# Patient Record
Sex: FEMALE | Race: ASIAN | NOT HISPANIC OR LATINO | Employment: UNEMPLOYED | URBAN - METROPOLITAN AREA
[De-identification: names, ages, dates, MRNs, and addresses within clinical notes are randomized per-mention and may not be internally consistent; named-entity substitution may affect disease eponyms.]

---

## 2024-04-11 ENCOUNTER — LAB (OUTPATIENT)
Dept: LAB | Facility: LAB | Age: 29
End: 2024-04-11
Payer: COMMERCIAL

## 2024-04-11 ENCOUNTER — INITIAL PRENATAL (OUTPATIENT)
Dept: OBSTETRICS AND GYNECOLOGY | Facility: CLINIC | Age: 29
End: 2024-04-11
Payer: COMMERCIAL

## 2024-04-11 VITALS — WEIGHT: 144 LBS | DIASTOLIC BLOOD PRESSURE: 72 MMHG | SYSTOLIC BLOOD PRESSURE: 122 MMHG

## 2024-04-11 DIAGNOSIS — Z3A.28 28 WEEKS GESTATION OF PREGNANCY (HHS-HCC): Primary | ICD-10-CM

## 2024-04-11 DIAGNOSIS — Z3A.28 28 WEEKS GESTATION OF PREGNANCY (HHS-HCC): ICD-10-CM

## 2024-04-11 LAB
ABO GROUP (TYPE) IN BLOOD: NORMAL
ANTIBODY SCREEN: NORMAL
ERYTHROCYTE [DISTWIDTH] IN BLOOD BY AUTOMATED COUNT: 14 % (ref 11.5–14.5)
GLUCOSE 1H P 50 G GLC PO SERPL-MCNC: 151 MG/DL
HBV SURFACE AG SERPL QL IA: NONREACTIVE
HCT VFR BLD AUTO: 33.1 % (ref 36–46)
HCV AB SER QL: NONREACTIVE
HGB BLD-MCNC: 10.2 G/DL (ref 12–16)
HIV 1+2 AB+HIV1 P24 AG SERPL QL IA: NONREACTIVE
MCH RBC QN AUTO: 26.6 PG (ref 26–34)
MCHC RBC AUTO-ENTMCNC: 30.8 G/DL (ref 32–36)
MCV RBC AUTO: 86 FL (ref 80–100)
NRBC BLD-RTO: 0 /100 WBCS (ref 0–0)
PLATELET # BLD AUTO: 185 X10*3/UL (ref 150–450)
RBC # BLD AUTO: 3.83 X10*6/UL (ref 4–5.2)
RH FACTOR (ANTIGEN D): NORMAL
RUBV IGG SERPL IA-ACNC: 3.8 IA
RUBV IGG SERPL QL IA: POSITIVE
TREPONEMA PALLIDUM IGG+IGM AB [PRESENCE] IN SERUM OR PLASMA BY IMMUNOASSAY: NONREACTIVE
WBC # BLD AUTO: 11.1 X10*3/UL (ref 4.4–11.3)

## 2024-04-11 PROCEDURE — 86780 TREPONEMA PALLIDUM: CPT

## 2024-04-11 PROCEDURE — 87340 HEPATITIS B SURFACE AG IA: CPT

## 2024-04-11 PROCEDURE — 36415 COLL VENOUS BLD VENIPUNCTURE: CPT

## 2024-04-11 PROCEDURE — 0500F INITIAL PRENATAL CARE VISIT: CPT | Performed by: OBSTETRICS & GYNECOLOGY

## 2024-04-11 PROCEDURE — 83020 HEMOGLOBIN ELECTROPHORESIS: CPT | Performed by: OBSTETRICS & GYNECOLOGY

## 2024-04-11 PROCEDURE — 86850 RBC ANTIBODY SCREEN: CPT

## 2024-04-11 PROCEDURE — 83021 HEMOGLOBIN CHROMOTOGRAPHY: CPT

## 2024-04-11 PROCEDURE — 87389 HIV-1 AG W/HIV-1&-2 AB AG IA: CPT

## 2024-04-11 PROCEDURE — 85027 COMPLETE CBC AUTOMATED: CPT

## 2024-04-11 PROCEDURE — 86317 IMMUNOASSAY INFECTIOUS AGENT: CPT

## 2024-04-11 PROCEDURE — 82947 ASSAY GLUCOSE BLOOD QUANT: CPT

## 2024-04-11 PROCEDURE — 86901 BLOOD TYPING SEROLOGIC RH(D): CPT

## 2024-04-11 PROCEDURE — 86803 HEPATITIS C AB TEST: CPT

## 2024-04-11 PROCEDURE — 86900 BLOOD TYPING SEROLOGIC ABO: CPT

## 2024-04-11 ASSESSMENT — ENCOUNTER SYMPTOMS
DYSURIA: 0
FLANK PAIN: 0
SHORTNESS OF BREATH: 0
ABDOMINAL DISTENTION: 0
FATIGUE: 0
APPETITE CHANGE: 0
CONSTIPATION: 0
BACK PAIN: 0
CHILLS: 0
FREQUENCY: 0
HEMATURIA: 0
FEVER: 0
ABDOMINAL PAIN: 0
VOMITING: 0
NAUSEA: 0
DIARRHEA: 0
UNEXPECTED WEIGHT CHANGE: 0
BLOOD IN STOOL: 0
SLEEP DISTURBANCE: 0
COLOR CHANGE: 0

## 2024-04-11 NOTE — PROGRESS NOTES
Subjective   Patient ID [id number]   Mey Otoole is a 29 y.o.  at 28w4d with a working estimated date of delivery of 2024, by Ultrasound who presents for an initial prenatal visit/transfer of care.  Recently moved from [facility].    Pt is here with a friend, Rowena, who helps with scheduling/getting around and her , Matthew.    [employer]  used throughout visit. Pt gave consent for friend and  to be present through full history and physical exam portions of visit.     Her pregnancy is so far uncomplicated.   Pt appears to have full PN care prior to moving to . She has a folder with all her test results and ultrasounds which are mostly in Brazilian.     Pt denies major medical problems. She is only taking a PNV.  She denies h/o surgery.     OB History    Para Term  AB Living   1         0   SAB IAB Ectopic Multiple Live Births                  # Outcome Date GA Lbr Gunnar/2nd Weight Sex Delivery Anes PTL Lv   1 Current                 Review of Systems   Constitutional:  Negative for appetite change, chills, fatigue, fever and unexpected weight change.   Respiratory:  Negative for shortness of breath.    Cardiovascular:  Negative for chest pain.   Gastrointestinal:  Negative for abdominal distention, abdominal pain, blood in stool, constipation, diarrhea, nausea and vomiting.   Endocrine: Negative for cold intolerance and heat intolerance.   Genitourinary:  Negative for dyspareunia, dysuria, flank pain, frequency, genital sores, hematuria, menstrual problem, pelvic pain, urgency, vaginal bleeding, vaginal discharge and vaginal pain.   Musculoskeletal:  Negative for back pain.   Skin:  Negative for color change.   Psychiatric/Behavioral:  Negative for sleep disturbance.        Objective     Physical Exam  Constitutional:       Appearance: Normal appearance.   Abdominal:      Palpations: Abdomen is soft.      Tenderness: There is no abdominal tenderness.      Comments: +gravid    Skin:     General: Skin is warm and dry.   Neurological:      General: No focal deficit present.      Mental Status: She is alert.   Psychiatric:         Mood and Affect: Mood normal.         Behavior: Behavior normal.                Expected Total Weight Gain: Could not be calculated   Pregravid BMI: Could not be calculated             Physical Exam  Constitutional:       Appearance: Normal appearance.   Abdominal:      Palpations: Abdomen is soft.      Tenderness: There is no abdominal tenderness.      Comments: +gravid   Neurological:      General: No focal deficit present.      Mental Status: She is alert.   Skin:     General: Skin is warm and dry.   Psychiatric:         Mood and Affect: Mood normal.         Behavior: Behavior normal.           Assessment/Plan         Prenatal Labs ordered - pt accepting of repeat labs so we may interpret and things like type and screen will be available in our system.   Pt has not yet done 1 hr glucose test - this will be ordered and completed today.   Growth US ordered.     Cont PNV daily.   First trimester screening was done an is normal.  The fetus is female sex.   Follow up in 2 weeks.

## 2024-04-13 LAB
HEMOGLOBIN A2: 2.7 % (ref 2–3.5)
HEMOGLOBIN A: 96.9 % (ref 95.8–98)
HEMOGLOBIN F: 0.4 % (ref 0–2)
HEMOGLOBIN IDENTIFICATION INTERPRETATION: NORMAL
PATH REVIEW-HGB IDENTIFICATION: NORMAL

## 2024-04-15 DIAGNOSIS — R73.09 GLUCOSE TOLERANCE TEST ABNORMAL: Primary | ICD-10-CM

## 2024-04-15 DIAGNOSIS — Z3A.28 28 WEEKS GESTATION OF PREGNANCY (HHS-HCC): ICD-10-CM

## 2024-04-16 ENCOUNTER — TELEPHONE (OUTPATIENT)
Dept: OBSTETRICS AND GYNECOLOGY | Facility: CLINIC | Age: 29
End: 2024-04-16
Payer: COMMERCIAL

## 2024-04-16 NOTE — TELEPHONE ENCOUNTER
Patient's emergency contact returning call  Identified by name and   Patient inquiring about [facility] information, stated that she spoke with [facility] office who told her that she needs to reach back out to our office to sign up. Informed her she could bring this up at patient appointment on Monday and would likely be able to sign up in person then and discuss with Dr. Cardoza.    Informed patient's proxy of her blood work results, showing anemia and elevated glucose, and need for 3 hour GTT. Informed her that level was at 151, and we like to see a level of <135.  Patient educated that she will need to fast meaning nothing to eat or drink for at least 8 hours prior to the test except water.   The test will take approximately 3 hours requiring 4 blood draws and she must stay at the lab during that time.   Patient encouraged to complete testing as soon as possible.   Patient's proxy verbalized understanding and denies any questions or concerns, will be bringing patient Saturday to have this done, as well as to repeat other blood work labs for her anemia.  All questions and concerns addressed at this time.    LELA LópezN RN

## 2024-04-16 NOTE — TELEPHONE ENCOUNTER
Called patient to discuss results and follow up  Left vm for patient to return call.    ALEXANDR López RN        ----- Message from Katie TEAGUE MD sent at 4/15/2024  4:31 PM EDT -----  This patient is Bulgarian speaking but ok to speak with her , Matthew.  Pt had elevated 1 hr glucose test and needs 3 hr glucose test. Also had some mild anemia. Needs additional labs for anemia (ferritin, B12 and folate) which I will order and patient can have drawn at time of 3 hr glucose test.  Thanks.

## 2024-04-16 NOTE — TELEPHONE ENCOUNTER
Patient went to the [facility] office and tried to apply but was told she needed to sign up through her OBGYN's office.  Patients friend/advocate would like a call.

## 2024-04-18 ENCOUNTER — HOSPITAL ENCOUNTER (OUTPATIENT)
Dept: RADIOLOGY | Facility: HOSPITAL | Age: 29
Discharge: HOME | End: 2024-04-18
Payer: COMMERCIAL

## 2024-04-18 DIAGNOSIS — Z3A.28 28 WEEKS GESTATION OF PREGNANCY (HHS-HCC): ICD-10-CM

## 2024-04-18 PROCEDURE — 76811 OB US DETAILED SNGL FETUS: CPT | Performed by: MEDICAL GENETICS

## 2024-04-18 PROCEDURE — 76819 FETAL BIOPHYS PROFIL W/O NST: CPT | Performed by: MEDICAL GENETICS

## 2024-04-18 PROCEDURE — 76811 OB US DETAILED SNGL FETUS: CPT

## 2024-04-20 ENCOUNTER — LAB (OUTPATIENT)
Dept: LAB | Facility: LAB | Age: 29
End: 2024-04-20
Payer: COMMERCIAL

## 2024-04-22 ENCOUNTER — ROUTINE PRENATAL (OUTPATIENT)
Dept: OBSTETRICS AND GYNECOLOGY | Facility: CLINIC | Age: 29
End: 2024-04-22
Payer: COMMERCIAL

## 2024-04-22 VITALS — WEIGHT: 146 LBS | SYSTOLIC BLOOD PRESSURE: 109 MMHG | DIASTOLIC BLOOD PRESSURE: 65 MMHG

## 2024-04-22 DIAGNOSIS — Z3A.30 30 WEEKS GESTATION OF PREGNANCY (HHS-HCC): Primary | ICD-10-CM

## 2024-04-22 PROCEDURE — 0501F PRENATAL FLOW SHEET: CPT | Performed by: OBSTETRICS & GYNECOLOGY

## 2024-04-22 PROCEDURE — 90471 IMMUNIZATION ADMIN: CPT | Performed by: OBSTETRICS & GYNECOLOGY

## 2024-04-22 PROCEDURE — 90715 TDAP VACCINE 7 YRS/> IM: CPT | Performed by: OBSTETRICS & GYNECOLOGY

## 2024-04-22 NOTE — PROGRESS NOTES
Pt currently 30w1d  Pt has no complaints.    Normal fetal movement. Denies ctx, LOF, VB.     [employer]  used throughout visit.     OB labs reviewed and wnl.  TDAP offered and accepted today    Abnormal 1 hr glucose test  Plans 3 hr glucose tomorrow    Anemia   Follow up blood work tomorrow with 3 hr glucose test    Ultrasound  EFW 1449 gm @ 29.4 weeks  Follow up 4 weeks for growth ultrasound    Cont PNV  Follow up 2 weeks with PNV

## 2024-04-23 ENCOUNTER — LAB (OUTPATIENT)
Dept: LAB | Facility: LAB | Age: 29
End: 2024-04-23
Payer: COMMERCIAL

## 2024-04-23 DIAGNOSIS — R73.09 GLUCOSE TOLERANCE TEST ABNORMAL: ICD-10-CM

## 2024-04-23 DIAGNOSIS — Z3A.28 28 WEEKS GESTATION OF PREGNANCY (HHS-HCC): ICD-10-CM

## 2024-04-23 LAB
FERRITIN SERPL-MCNC: 42 NG/ML (ref 8–150)
FOLATE SERPL-MCNC: >24 NG/ML
GLUCOSE 1H P 100 G GLC PO SERPL-MCNC: 174 MG/DL
GLUCOSE 2H P 100 G GLC PO SERPL-MCNC: 137 MG/DL
GLUCOSE 3H P 100 G GLC PO SERPL-MCNC: 77 MG/DL
GLUCOSE P FAST SERPL-MCNC: 88 MG/DL
IRON SATN MFR SERPL: 54 % (ref 25–45)
IRON SERPL-MCNC: 246 UG/DL (ref 35–150)
TIBC SERPL-MCNC: 456 UG/DL (ref 240–445)
UIBC SERPL-MCNC: 210 UG/DL (ref 110–370)
VIT B12 SERPL-MCNC: 363 PG/ML (ref 211–911)

## 2024-04-23 PROCEDURE — 82952 GTT-ADDED SAMPLES: CPT

## 2024-04-23 PROCEDURE — 82950 GLUCOSE TEST: CPT

## 2024-04-23 PROCEDURE — 36415 COLL VENOUS BLD VENIPUNCTURE: CPT

## 2024-04-23 PROCEDURE — 82607 VITAMIN B-12: CPT

## 2024-04-23 PROCEDURE — 82728 ASSAY OF FERRITIN: CPT

## 2024-04-23 PROCEDURE — 82947 ASSAY GLUCOSE BLOOD QUANT: CPT

## 2024-04-23 PROCEDURE — 83540 ASSAY OF IRON: CPT

## 2024-04-23 PROCEDURE — 82746 ASSAY OF FOLIC ACID SERUM: CPT

## 2024-04-23 PROCEDURE — 83550 IRON BINDING TEST: CPT

## 2024-05-09 ENCOUNTER — HOSPITAL ENCOUNTER (OUTPATIENT)
Dept: RADIOLOGY | Facility: HOSPITAL | Age: 29
Discharge: HOME | End: 2024-05-09
Payer: COMMERCIAL

## 2024-05-09 ENCOUNTER — APPOINTMENT (OUTPATIENT)
Dept: OBSTETRICS AND GYNECOLOGY | Facility: CLINIC | Age: 29
End: 2024-05-09
Payer: COMMERCIAL

## 2024-05-09 DIAGNOSIS — Z3A.28 28 WEEKS GESTATION OF PREGNANCY (HHS-HCC): ICD-10-CM

## 2024-05-09 PROCEDURE — 76816 OB US FOLLOW-UP PER FETUS: CPT | Performed by: MEDICAL GENETICS

## 2024-05-09 PROCEDURE — 76819 FETAL BIOPHYS PROFIL W/O NST: CPT | Performed by: MEDICAL GENETICS

## 2024-05-09 PROCEDURE — 76816 OB US FOLLOW-UP PER FETUS: CPT

## 2024-05-13 ENCOUNTER — ROUTINE PRENATAL (OUTPATIENT)
Dept: OBSTETRICS AND GYNECOLOGY | Facility: CLINIC | Age: 29
End: 2024-05-13
Payer: COMMERCIAL

## 2024-05-13 VITALS — WEIGHT: 150 LBS | SYSTOLIC BLOOD PRESSURE: 104 MMHG | DIASTOLIC BLOOD PRESSURE: 66 MMHG

## 2024-05-13 DIAGNOSIS — Z3A.33 33 WEEKS GESTATION OF PREGNANCY (HHS-HCC): Primary | ICD-10-CM

## 2024-05-13 DIAGNOSIS — D64.9 ANEMIA, UNSPECIFIED TYPE: Primary | ICD-10-CM

## 2024-05-13 PROCEDURE — 0501F PRENATAL FLOW SHEET: CPT | Performed by: OBSTETRICS & GYNECOLOGY

## 2024-05-13 RX ORDER — FERROUS SULFATE 325(65) MG
325 TABLET ORAL
Qty: 90 TABLET | Refills: 3 | Status: SHIPPED | OUTPATIENT
Start: 2024-05-13 | End: 2025-05-13

## 2024-05-13 NOTE — PROGRESS NOTES
Pt currently 33w1d  Pt has no complaints.    Normal fetal movement. Denies ctx, LOF, VB.    [employer]  used throughout visit.     Initial prenatal care in [place]. Initial prenatal labs repeated on transfer of care and wnl.     Anemia  -start iron daily     S/p TDAP    Abnormal 1 hr glucose, normal 3 hour glucose.     Growth US 5/9/24 wnl @ 32.4 weeks   -EFW 2095 gm (53rd percentile)  -AGGIE 15.9 cm   -placenta posterior  -presentation: transverse lie

## 2024-05-20 ENCOUNTER — ROUTINE PRENATAL (OUTPATIENT)
Dept: OBSTETRICS AND GYNECOLOGY | Facility: CLINIC | Age: 29
End: 2024-05-20
Payer: COMMERCIAL

## 2024-05-20 VITALS — SYSTOLIC BLOOD PRESSURE: 105 MMHG | DIASTOLIC BLOOD PRESSURE: 68 MMHG | WEIGHT: 151 LBS

## 2024-05-20 DIAGNOSIS — Z3A.34 34 WEEKS GESTATION OF PREGNANCY (HHS-HCC): Primary | ICD-10-CM

## 2024-05-20 PROCEDURE — 0501F PRENATAL FLOW SHEET: CPT | Performed by: OBSTETRICS & GYNECOLOGY

## 2024-05-20 NOTE — PROGRESS NOTES
Pt currently 34w1d  Pt has no complaints.    Normal fetal movement. Denies ctx, LOF, VB.     [employer]  used throughout visit.      Initial prenatal care in [place]. Initial prenatal labs repeated on transfer of care and wnl.   Reviewed hospital for delivery - pt and  prefer  main [place].   Labor precautions reviewed, advised pt/ they can call and speak with doctor on-call after hours if any questions or concerns.      Anemia  -continue iron daily     S/p TDAP     Abnormal 1 hr glucose, normal 3 hour glucose.      Growth US 5/9/24 wnl @ 32.4 weeks   -EFW 2095 gm (53rd percentile)  -AGGIE 15.9 cm   -placenta posterior  -presentation: transverse lie

## 2024-05-29 ENCOUNTER — ROUTINE PRENATAL (OUTPATIENT)
Dept: OBSTETRICS AND GYNECOLOGY | Facility: CLINIC | Age: 29
End: 2024-05-29
Payer: COMMERCIAL

## 2024-05-29 VITALS — DIASTOLIC BLOOD PRESSURE: 59 MMHG | WEIGHT: 151 LBS | SYSTOLIC BLOOD PRESSURE: 110 MMHG

## 2024-05-29 DIAGNOSIS — Z3A.35 35 WEEKS GESTATION OF PREGNANCY (HHS-HCC): Primary | ICD-10-CM

## 2024-05-29 PROCEDURE — 0501F PRENATAL FLOW SHEET: CPT | Performed by: OBSTETRICS & GYNECOLOGY

## 2024-05-29 NOTE — PROGRESS NOTES
Pt currently 35w3d  Pt has no complaints.    Normal fetal movement. Denies ctx, LOF, VB.     [employer]  used throughout visit.      Initial prenatal care in [place]. Initial prenatal labs repeated on transfer of care and wnl.   Reviewed hospital for delivery - pt and  prefer  main [place].   Labor precautions reviewed, advised pt/ they can call and speak with doctor on-call after hours if any questions or concerns.      Anemia  -continue iron daily     S/p TDAP     Abnormal 1 hr glucose, normal 3 hour glucose.      Growth US 5/9/24 wnl @ 32.4 weeks   -EFW 2095 gm (53rd percentile)  -AGGIE 15.9 cm   -placenta posterior  -presentation: transverse lie    GBS cx next visit   Consider US next week for presentation

## 2024-06-03 ENCOUNTER — ROUTINE PRENATAL (OUTPATIENT)
Dept: OBSTETRICS AND GYNECOLOGY | Facility: CLINIC | Age: 29
End: 2024-06-03
Payer: COMMERCIAL

## 2024-06-03 VITALS — WEIGHT: 155 LBS | SYSTOLIC BLOOD PRESSURE: 110 MMHG | DIASTOLIC BLOOD PRESSURE: 60 MMHG

## 2024-06-03 DIAGNOSIS — Z3A.36 36 WEEKS GESTATION OF PREGNANCY (HHS-HCC): Primary | ICD-10-CM

## 2024-06-03 PROCEDURE — 87081 CULTURE SCREEN ONLY: CPT

## 2024-06-03 PROCEDURE — 0501F PRENATAL FLOW SHEET: CPT | Performed by: OBSTETRICS & GYNECOLOGY

## 2024-06-03 NOTE — PROGRESS NOTES
Pt currently 36w1d  Pt has no complaints.    Normal fetal movement. Denies ctx, LOF, VB.     [employer]  used throughout visit.      Initial prenatal care in [place]. Initial prenatal labs repeated on transfer of care and wnl.   Reviewed hospital for delivery - pt and  prefer [facility].   Labor precautions reviewed, advised pt/ they can call and speak with doctor on-call after hours if any questions or concerns.      Anemia  -continue iron daily     S/p TDAP     Abnormal 1 hr glucose, normal 3 hour glucose.     GBS cx done today     Baby cephalic on leopold's today

## 2024-06-06 LAB — GP B STREP GENITAL QL CULT: ABNORMAL

## 2024-06-10 ENCOUNTER — ROUTINE PRENATAL (OUTPATIENT)
Dept: OBSTETRICS AND GYNECOLOGY | Facility: CLINIC | Age: 29
End: 2024-06-10
Payer: COMMERCIAL

## 2024-06-10 VITALS — DIASTOLIC BLOOD PRESSURE: 65 MMHG | WEIGHT: 156 LBS | SYSTOLIC BLOOD PRESSURE: 103 MMHG

## 2024-06-10 DIAGNOSIS — Z3A.37 37 WEEKS GESTATION OF PREGNANCY (HHS-HCC): ICD-10-CM

## 2024-06-10 DIAGNOSIS — Z33.1 NORMAL PREGNANCY, INCIDENTAL (HHS-HCC): Primary | ICD-10-CM

## 2024-06-10 PROCEDURE — 0501F PRENATAL FLOW SHEET: CPT | Performed by: OBSTETRICS & GYNECOLOGY

## 2024-06-10 NOTE — PROGRESS NOTES
Pt currently 37w1d  Pt has no complaints.    Normal fetal movement. Denies ctx, LOF, VB.     [employer]  used throughout visit.     Reviewed GBS positive status and need for abx in labor.   Labor precautions reviewed.

## 2024-06-17 ENCOUNTER — APPOINTMENT (OUTPATIENT)
Dept: OBSTETRICS AND GYNECOLOGY | Facility: CLINIC | Age: 29
End: 2024-06-17
Payer: COMMERCIAL

## 2024-06-17 VITALS — WEIGHT: 161 LBS | SYSTOLIC BLOOD PRESSURE: 118 MMHG | DIASTOLIC BLOOD PRESSURE: 77 MMHG

## 2024-06-17 DIAGNOSIS — Z3A.38 38 WEEKS GESTATION OF PREGNANCY (HHS-HCC): Primary | ICD-10-CM

## 2024-06-17 PROCEDURE — 0501F PRENATAL FLOW SHEET: CPT | Performed by: OBSTETRICS & GYNECOLOGY

## 2024-06-17 NOTE — PROGRESS NOTES
Pt currently 38w1d  Pt has no complaints.    Normal fetal movement. Denies ctx, LOF, VB.     [employer]  used throughout visit.     Presentation confirmed vertex by bedside ultrasound today.      Reviewed GBS positive status and need for abx in labor.   Labor precautions reviewed.

## 2024-06-24 ENCOUNTER — LAB (OUTPATIENT)
Dept: LAB | Facility: LAB | Age: 29
End: 2024-06-24
Payer: COMMERCIAL

## 2024-06-24 ENCOUNTER — TELEPHONE (OUTPATIENT)
Dept: OBSTETRICS AND GYNECOLOGY | Facility: CLINIC | Age: 29
End: 2024-06-24

## 2024-06-24 ENCOUNTER — APPOINTMENT (OUTPATIENT)
Dept: OBSTETRICS AND GYNECOLOGY | Facility: CLINIC | Age: 29
End: 2024-06-24
Payer: COMMERCIAL

## 2024-06-24 VITALS — WEIGHT: 163 LBS | DIASTOLIC BLOOD PRESSURE: 76 MMHG | SYSTOLIC BLOOD PRESSURE: 109 MMHG

## 2024-06-24 DIAGNOSIS — Z3A.39 39 WEEKS GESTATION OF PREGNANCY (HHS-HCC): Primary | ICD-10-CM

## 2024-06-24 DIAGNOSIS — O99.713 PRURITUS OF PREGNANCY IN THIRD TRIMESTER (HHS-HCC): ICD-10-CM

## 2024-06-24 DIAGNOSIS — L29.9 PRURITUS OF PREGNANCY IN THIRD TRIMESTER (HHS-HCC): ICD-10-CM

## 2024-06-24 DIAGNOSIS — Z3A.39 39 WEEKS GESTATION OF PREGNANCY (HHS-HCC): ICD-10-CM

## 2024-06-24 LAB
ALBUMIN SERPL BCP-MCNC: 3.4 G/DL (ref 3.4–5)
ALP SERPL-CCNC: 109 U/L (ref 33–110)
ALT SERPL W P-5'-P-CCNC: 15 U/L (ref 7–45)
ANION GAP SERPL CALC-SCNC: 15 MMOL/L (ref 10–20)
AST SERPL W P-5'-P-CCNC: 18 U/L (ref 9–39)
BILIRUB SERPL-MCNC: 0.4 MG/DL (ref 0–1.2)
BUN SERPL-MCNC: 8 MG/DL (ref 6–23)
CALCIUM SERPL-MCNC: 8.8 MG/DL (ref 8.6–10.6)
CHLORIDE SERPL-SCNC: 104 MMOL/L (ref 98–107)
CO2 SERPL-SCNC: 22 MMOL/L (ref 21–32)
CREAT SERPL-MCNC: 0.63 MG/DL (ref 0.5–1.05)
EGFRCR SERPLBLD CKD-EPI 2021: >90 ML/MIN/1.73M*2
ERYTHROCYTE [DISTWIDTH] IN BLOOD BY AUTOMATED COUNT: 13.9 % (ref 11.5–14.5)
GLUCOSE SERPL-MCNC: 126 MG/DL (ref 74–99)
HCT VFR BLD AUTO: 37.1 % (ref 36–46)
HGB BLD-MCNC: 11.7 G/DL (ref 12–16)
MCH RBC QN AUTO: 27.9 PG (ref 26–34)
MCHC RBC AUTO-ENTMCNC: 31.5 G/DL (ref 32–36)
MCV RBC AUTO: 89 FL (ref 80–100)
NRBC BLD-RTO: 0 /100 WBCS (ref 0–0)
PLATELET # BLD AUTO: 187 X10*3/UL (ref 150–450)
POTASSIUM SERPL-SCNC: 4 MMOL/L (ref 3.5–5.3)
PROT SERPL-MCNC: 5.9 G/DL (ref 6.4–8.2)
RBC # BLD AUTO: 4.19 X10*6/UL (ref 4–5.2)
SODIUM SERPL-SCNC: 137 MMOL/L (ref 136–145)
WBC # BLD AUTO: 9.1 X10*3/UL (ref 4.4–11.3)

## 2024-06-24 PROCEDURE — 82239 BILE ACIDS TOTAL: CPT

## 2024-06-24 PROCEDURE — 83789 MASS SPECTROMETRY QUAL/QUAN: CPT

## 2024-06-24 PROCEDURE — 0501F PRENATAL FLOW SHEET: CPT | Performed by: OBSTETRICS & GYNECOLOGY

## 2024-06-24 PROCEDURE — 36415 COLL VENOUS BLD VENIPUNCTURE: CPT

## 2024-06-24 PROCEDURE — 85027 COMPLETE CBC AUTOMATED: CPT

## 2024-06-24 PROCEDURE — 80053 COMPREHEN METABOLIC PANEL: CPT

## 2024-06-24 NOTE — TELEPHONE ENCOUNTER
LM for patient's spouse Matthew to return phone call. Would like to offer IOL for possible cholestasis.  Will take several days for bile acids to return and due to pregnancy >39 weeks, IOL is reasonable now.

## 2024-06-24 NOTE — PROGRESS NOTES
Pt currently 39w1d  Pt complains of some itching all over body at night over the last 4 nights. Comes and goes.  Not worse on any one part of the body.    Normal fetal movement. Denies ctx, LOF, VB.     [employer]  used throughout visit. (Venezuelan)    Pruritus of pregnancy  -no rash or lesions ; happens only at night, not during the day at all  -bile acids ordered  -short follow up on Thursday  -advised pt to monitor fetal movement closely and call/message me if itching is worsening    Reviewed GBS positive status and need for abx in labor.   Labor precautions reviewed.

## 2024-06-26 LAB
BILE AC SERPL-SCNC: 4 UMOL/L (ref 0–7)
BILE AC SERPL-SCNC: 5 UMOL/L (ref 0–10)
CDCAE SERPL-SCNC: 1.8 UMOL/L (ref 0–3.4)
CHOLATE SERPL-SCNC: 0.9 UMOL/L (ref 0–1.9)
DO-CHOLATE SERPL-SCNC: 1 UMOL/L (ref 0–2.5)
URSODEOXYCHOLATE SERPL-SCNC: 0.3 UMOL/L (ref 0–1)

## 2024-06-27 ENCOUNTER — ANESTHESIA (OUTPATIENT)
Dept: OBSTETRICS AND GYNECOLOGY | Facility: HOSPITAL | Age: 29
End: 2024-06-27
Payer: COMMERCIAL

## 2024-06-27 ENCOUNTER — APPOINTMENT (OUTPATIENT)
Dept: OBSTETRICS AND GYNECOLOGY | Facility: CLINIC | Age: 29
End: 2024-06-27
Payer: COMMERCIAL

## 2024-06-27 ENCOUNTER — ANESTHESIA EVENT (OUTPATIENT)
Dept: OBSTETRICS AND GYNECOLOGY | Facility: HOSPITAL | Age: 29
End: 2024-06-27
Payer: COMMERCIAL

## 2024-06-27 ENCOUNTER — HOSPITAL ENCOUNTER (INPATIENT)
Facility: HOSPITAL | Age: 29
LOS: 3 days | Discharge: HOME | End: 2024-06-30
Attending: STUDENT IN AN ORGANIZED HEALTH CARE EDUCATION/TRAINING PROGRAM
Payer: COMMERCIAL

## 2024-06-27 DIAGNOSIS — Z87.59 STATUS POST VACUUM-ASSISTED VAGINAL DELIVERY: Primary | ICD-10-CM

## 2024-06-27 DIAGNOSIS — D64.9 ANEMIA, UNSPECIFIED TYPE: ICD-10-CM

## 2024-06-27 LAB
ABO GROUP (TYPE) IN BLOOD: NORMAL
ANTIBODY SCREEN: NORMAL
ERYTHROCYTE [DISTWIDTH] IN BLOOD BY AUTOMATED COUNT: 13.5 % (ref 11.5–14.5)
HCT VFR BLD AUTO: 38.8 % (ref 36–46)
HGB BLD-MCNC: 12.3 G/DL (ref 12–16)
MCH RBC QN AUTO: 27.5 PG (ref 26–34)
MCHC RBC AUTO-ENTMCNC: 31.7 G/DL (ref 32–36)
MCV RBC AUTO: 87 FL (ref 80–100)
NRBC BLD-RTO: 0 /100 WBCS (ref 0–0)
PLATELET # BLD AUTO: 201 X10*3/UL (ref 150–450)
POC APPEARANCE, URINE: CLEAR
POC BILIRUBIN, URINE: NEGATIVE
POC BLOOD, URINE: ABNORMAL
POC COLOR, URINE: YELLOW
POC GLUCOSE, URINE: NEGATIVE MG/DL
POC KETONES, URINE: NEGATIVE MG/DL
POC LEUKOCYTES, URINE: ABNORMAL
POC NITRITE,URINE: NEGATIVE
POC PH, URINE: 7 PH
POC PROTEIN, URINE: NEGATIVE MG/DL
POC SPECIFIC GRAVITY, URINE: 1.01
POC UROBILINOGEN, URINE: 0.2 EU/DL
RBC # BLD AUTO: 4.47 X10*6/UL (ref 4–5.2)
RH FACTOR (ANTIGEN D): NORMAL
TREPONEMA PALLIDUM IGG+IGM AB [PRESENCE] IN SERUM OR PLASMA BY IMMUNOASSAY: NONREACTIVE
WBC # BLD AUTO: 10.5 X10*3/UL (ref 4.4–11.3)

## 2024-06-27 PROCEDURE — 2500000004 HC RX 250 GENERAL PHARMACY W/ HCPCS (ALT 636 FOR OP/ED)

## 2024-06-27 PROCEDURE — 86780 TREPONEMA PALLIDUM: CPT

## 2024-06-27 PROCEDURE — 59025 FETAL NON-STRESS TEST: CPT

## 2024-06-27 PROCEDURE — 59050 FETAL MONITOR W/REPORT: CPT

## 2024-06-27 PROCEDURE — 99214 OFFICE O/P EST MOD 30 MIN: CPT | Mod: 25

## 2024-06-27 PROCEDURE — 85027 COMPLETE CBC AUTOMATED: CPT

## 2024-06-27 PROCEDURE — 36415 COLL VENOUS BLD VENIPUNCTURE: CPT

## 2024-06-27 PROCEDURE — 7210000002 HC LABOR PER HOUR

## 2024-06-27 PROCEDURE — 86901 BLOOD TYPING SEROLOGIC RH(D): CPT

## 2024-06-27 PROCEDURE — 51701 INSERT BLADDER CATHETER: CPT

## 2024-06-27 PROCEDURE — 3E033VJ INTRODUCTION OF OTHER HORMONE INTO PERIPHERAL VEIN, PERCUTANEOUS APPROACH: ICD-10-PCS

## 2024-06-27 PROCEDURE — 2500000005 HC RX 250 GENERAL PHARMACY W/O HCPCS

## 2024-06-27 PROCEDURE — 81002 URINALYSIS NONAUTO W/O SCOPE: CPT

## 2024-06-27 PROCEDURE — 3700000014 EPIDURAL BLOCK: Mod: GC

## 2024-06-27 PROCEDURE — 1120000001 HC OB PRIVATE ROOM DAILY

## 2024-06-27 PROCEDURE — 86850 RBC ANTIBODY SCREEN: CPT

## 2024-06-27 RX ORDER — ACETAMINOPHEN 325 MG/1
975 TABLET ORAL EVERY 6 HOURS PRN
Status: DISCONTINUED | OUTPATIENT
Start: 2024-06-27 | End: 2024-06-28

## 2024-06-27 RX ORDER — CARBOPROST TROMETHAMINE 250 UG/ML
250 INJECTION, SOLUTION INTRAMUSCULAR ONCE AS NEEDED
Status: DISCONTINUED | OUTPATIENT
Start: 2024-06-27 | End: 2024-06-28

## 2024-06-27 RX ORDER — ONDANSETRON HYDROCHLORIDE 2 MG/ML
4 INJECTION, SOLUTION INTRAVENOUS EVERY 6 HOURS PRN
Status: DISCONTINUED | OUTPATIENT
Start: 2024-06-27 | End: 2024-06-28

## 2024-06-27 RX ORDER — TERBUTALINE SULFATE 1 MG/ML
0.25 INJECTION SUBCUTANEOUS ONCE AS NEEDED
Status: DISCONTINUED | OUTPATIENT
Start: 2024-06-27 | End: 2024-06-28

## 2024-06-27 RX ORDER — LOPERAMIDE HYDROCHLORIDE 2 MG/1
4 CAPSULE ORAL EVERY 2 HOUR PRN
Status: DISCONTINUED | OUTPATIENT
Start: 2024-06-27 | End: 2024-06-28

## 2024-06-27 RX ORDER — METOCLOPRAMIDE HYDROCHLORIDE 5 MG/ML
10 INJECTION INTRAMUSCULAR; INTRAVENOUS EVERY 6 HOURS PRN
Status: DISCONTINUED | OUTPATIENT
Start: 2024-06-27 | End: 2024-06-28

## 2024-06-27 RX ORDER — HYDRALAZINE HYDROCHLORIDE 20 MG/ML
5 INJECTION INTRAMUSCULAR; INTRAVENOUS ONCE AS NEEDED
Status: DISCONTINUED | OUTPATIENT
Start: 2024-06-27 | End: 2024-06-28

## 2024-06-27 RX ORDER — NIFEDIPINE 10 MG/1
10 CAPSULE ORAL ONCE AS NEEDED
Status: DISCONTINUED | OUTPATIENT
Start: 2024-06-27 | End: 2024-06-28

## 2024-06-27 RX ORDER — SODIUM CHLORIDE, SODIUM LACTATE, POTASSIUM CHLORIDE, CALCIUM CHLORIDE 600; 310; 30; 20 MG/100ML; MG/100ML; MG/100ML; MG/100ML
125 INJECTION, SOLUTION INTRAVENOUS CONTINUOUS
Status: DISCONTINUED | OUTPATIENT
Start: 2024-06-27 | End: 2024-06-28

## 2024-06-27 RX ORDER — OXYTOCIN/0.9 % SODIUM CHLORIDE 30/500 ML
60 PLASTIC BAG, INJECTION (ML) INTRAVENOUS ONCE AS NEEDED
Status: DISCONTINUED | OUTPATIENT
Start: 2024-06-27 | End: 2024-06-28

## 2024-06-27 RX ORDER — METOCLOPRAMIDE 10 MG/1
10 TABLET ORAL EVERY 6 HOURS PRN
Status: DISCONTINUED | OUTPATIENT
Start: 2024-06-27 | End: 2024-06-28

## 2024-06-27 RX ORDER — FENTANYL/BUPIVACAINE/NS/PF 2MCG/ML-.1
PLASTIC BAG, INJECTION (ML) INJECTION AS NEEDED
Status: DISCONTINUED | OUTPATIENT
Start: 2024-06-27 | End: 2024-06-28

## 2024-06-27 RX ORDER — LIDOCAINE HYDROCHLORIDE 10 MG/ML
30 INJECTION INFILTRATION; PERINEURAL ONCE AS NEEDED
Status: DISCONTINUED | OUTPATIENT
Start: 2024-06-27 | End: 2024-06-28

## 2024-06-27 RX ORDER — ONDANSETRON 4 MG/1
4 TABLET, FILM COATED ORAL EVERY 6 HOURS PRN
Status: DISCONTINUED | OUTPATIENT
Start: 2024-06-27 | End: 2024-06-28

## 2024-06-27 RX ORDER — TRANEXAMIC ACID 100 MG/ML
1000 INJECTION, SOLUTION INTRAVENOUS ONCE AS NEEDED
Status: COMPLETED | OUTPATIENT
Start: 2024-06-27 | End: 2024-06-28

## 2024-06-27 RX ORDER — OXYTOCIN/0.9 % SODIUM CHLORIDE 30/500 ML
2-30 PLASTIC BAG, INJECTION (ML) INTRAVENOUS CONTINUOUS
Status: DISCONTINUED | OUTPATIENT
Start: 2024-06-27 | End: 2024-06-28

## 2024-06-27 RX ORDER — LABETALOL HYDROCHLORIDE 5 MG/ML
20 INJECTION, SOLUTION INTRAVENOUS ONCE AS NEEDED
Status: DISCONTINUED | OUTPATIENT
Start: 2024-06-27 | End: 2024-06-28

## 2024-06-27 RX ORDER — METHYLERGONOVINE MALEATE 0.2 MG/ML
0.2 INJECTION INTRAVENOUS ONCE AS NEEDED
Status: COMPLETED | OUTPATIENT
Start: 2024-06-27 | End: 2024-06-28

## 2024-06-27 RX ORDER — MISOPROSTOL 200 UG/1
800 TABLET ORAL ONCE AS NEEDED
Status: COMPLETED | OUTPATIENT
Start: 2024-06-27 | End: 2024-06-28

## 2024-06-27 RX ORDER — OXYTOCIN 10 [USP'U]/ML
10 INJECTION, SOLUTION INTRAMUSCULAR; INTRAVENOUS ONCE AS NEEDED
Status: DISCONTINUED | OUTPATIENT
Start: 2024-06-27 | End: 2024-06-28

## 2024-06-27 RX ORDER — PENICILLIN G 3000000 [IU]/50ML
3 INJECTION, SOLUTION INTRAVENOUS EVERY 4 HOURS
Status: DISCONTINUED | OUTPATIENT
Start: 2024-06-27 | End: 2024-06-28

## 2024-06-27 RX ORDER — LIDOCAINE HCL/EPINEPHRINE/PF 2%-1:200K
VIAL (ML) INJECTION AS NEEDED
Status: DISCONTINUED | OUTPATIENT
Start: 2024-06-27 | End: 2024-06-28

## 2024-06-27 RX ADMIN — ACETAMINOPHEN 975 MG: 325 TABLET ORAL at 16:12

## 2024-06-27 RX ADMIN — PENICILLIN G POTASSIUM 5 MILLION UNITS: 5000000 INJECTION, POWDER, FOR SOLUTION INTRAMUSCULAR; INTRAVENOUS at 09:33

## 2024-06-27 RX ADMIN — Medication 2 MILLI-UNITS/MIN: at 14:33

## 2024-06-27 RX ADMIN — SODIUM CHLORIDE, POTASSIUM CHLORIDE, SODIUM LACTATE AND CALCIUM CHLORIDE 125 ML/HR: 600; 310; 30; 20 INJECTION, SOLUTION INTRAVENOUS at 09:33

## 2024-06-27 RX ADMIN — PENICILLIN G 3 MILLION UNITS: 3000000 INJECTION, SOLUTION INTRAVENOUS at 21:51

## 2024-06-27 RX ADMIN — PENICILLIN G 3 MILLION UNITS: 3000000 INJECTION, SOLUTION INTRAVENOUS at 17:28

## 2024-06-27 RX ADMIN — SODIUM CHLORIDE, POTASSIUM CHLORIDE, SODIUM LACTATE AND CALCIUM CHLORIDE 500 ML: 600; 310; 30; 20 INJECTION, SOLUTION INTRAVENOUS at 16:28

## 2024-06-27 RX ADMIN — ONDANSETRON 4 MG: 2 INJECTION INTRAMUSCULAR; INTRAVENOUS at 16:35

## 2024-06-27 RX ADMIN — PENICILLIN G 3 MILLION UNITS: 3000000 INJECTION, SOLUTION INTRAVENOUS at 13:45

## 2024-06-27 SDOH — HEALTH STABILITY: MENTAL HEALTH: SUICIDAL BEHAVIOR (LIFETIME): NO

## 2024-06-27 SDOH — HEALTH STABILITY: MENTAL HEALTH: WERE YOU ABLE TO COMPLETE ALL THE BEHAVIORAL HEALTH SCREENINGS?: YES

## 2024-06-27 SDOH — SOCIAL STABILITY: SOCIAL INSECURITY: VERBAL ABUSE: DENIES

## 2024-06-27 SDOH — HEALTH STABILITY: MENTAL HEALTH: HAVE YOU USED ANY PRESCRIPTION DRUGS OTHER THAN PRESCRIBED IN THE PAST 12 MONTHS?: NO

## 2024-06-27 SDOH — SOCIAL STABILITY: SOCIAL INSECURITY: DO YOU FEEL ANYONE HAS EXPLOITED OR TAKEN ADVANTAGE OF YOU FINANCIALLY OR OF YOUR PERSONAL PROPERTY?: NO

## 2024-06-27 SDOH — HEALTH STABILITY: MENTAL HEALTH: NON-SPECIFIC ACTIVE SUICIDAL THOUGHTS (PAST 1 MONTH): NO

## 2024-06-27 SDOH — HEALTH STABILITY: MENTAL HEALTH: CURRENT SMOKER: 0

## 2024-06-27 SDOH — SOCIAL STABILITY: SOCIAL INSECURITY: DOES ANYONE TRY TO KEEP YOU FROM HAVING/CONTACTING OTHER FRIENDS OR DOING THINGS OUTSIDE YOUR HOME?: NO

## 2024-06-27 SDOH — HEALTH STABILITY: MENTAL HEALTH: HAVE YOU USED ANY SUBSTANCES (CANABIS, COCAINE, HEROIN, HALLUCINOGENS, INHALANTS, ETC.) IN THE PAST 12 MONTHS?: NO

## 2024-06-27 SDOH — SOCIAL STABILITY: SOCIAL INSECURITY: ABUSE SCREEN: ADULT

## 2024-06-27 SDOH — SOCIAL STABILITY: SOCIAL INSECURITY: HAVE YOU HAD ANY THOUGHTS OF HARMING ANYONE ELSE?: NO

## 2024-06-27 SDOH — SOCIAL STABILITY: SOCIAL INSECURITY: HAVE YOU HAD THOUGHTS OF HARMING ANYONE ELSE?: NO

## 2024-06-27 SDOH — SOCIAL STABILITY: SOCIAL INSECURITY: HAS ANYONE EVER THREATENED TO HURT YOUR FAMILY OR YOUR PETS?: NO

## 2024-06-27 SDOH — ECONOMIC STABILITY: HOUSING INSECURITY: DO YOU FEEL UNSAFE GOING BACK TO THE PLACE WHERE YOU ARE LIVING?: NO

## 2024-06-27 SDOH — SOCIAL STABILITY: SOCIAL INSECURITY: ARE YOU OR HAVE YOU BEEN THREATENED OR ABUSED PHYSICALLY, EMOTIONALLY, OR SEXUALLY BY ANYONE?: NO

## 2024-06-27 SDOH — HEALTH STABILITY: MENTAL HEALTH: WISH TO BE DEAD (PAST 1 MONTH): NO

## 2024-06-27 SDOH — SOCIAL STABILITY: SOCIAL INSECURITY: ARE THERE ANY APPARENT SIGNS OF INJURIES/BEHAVIORS THAT COULD BE RELATED TO ABUSE/NEGLECT?: NO

## 2024-06-27 SDOH — SOCIAL STABILITY: SOCIAL INSECURITY: PHYSICAL ABUSE: DENIES

## 2024-06-27 ASSESSMENT — PAIN SCALES - GENERAL
PAINLEVEL_OUTOF10: 0 - NO PAIN
PAINLEVEL_OUTOF10: 0 - NO PAIN
PAINLEVEL_OUTOF10: 10 - WORST POSSIBLE PAIN
PAINLEVEL_OUTOF10: 5 - MODERATE PAIN
PAINLEVEL_OUTOF10: 0 - NO PAIN
PAINLEVEL_OUTOF10: 4
PAINLEVEL_OUTOF10: 3
PAINLEVEL_OUTOF10: 3
PAINLEVEL_OUTOF10: 0 - NO PAIN
PAINLEVEL_OUTOF10: 4
PAINLEVEL_OUTOF10: 0 - NO PAIN

## 2024-06-27 ASSESSMENT — PATIENT HEALTH QUESTIONNAIRE - PHQ9
SUM OF ALL RESPONSES TO PHQ9 QUESTIONS 1 & 2: 0
2. FEELING DOWN, DEPRESSED OR HOPELESS: NOT AT ALL
1. LITTLE INTEREST OR PLEASURE IN DOING THINGS: NOT AT ALL

## 2024-06-27 ASSESSMENT — COLUMBIA-SUICIDE SEVERITY RATING SCALE - C-SSRS
6. HAVE YOU EVER DONE ANYTHING, STARTED TO DO ANYTHING, OR PREPARED TO DO ANYTHING TO END YOUR LIFE?: NO
1. IN THE PAST MONTH, HAVE YOU WISHED YOU WERE DEAD OR WISHED YOU COULD GO TO SLEEP AND NOT WAKE UP?: NO
2. HAVE YOU ACTUALLY HAD ANY THOUGHTS OF KILLING YOURSELF?: NO

## 2024-06-27 ASSESSMENT — LIFESTYLE VARIABLES
SKIP TO QUESTIONS 9-10: 1
HOW MANY STANDARD DRINKS CONTAINING ALCOHOL DO YOU HAVE ON A TYPICAL DAY: PATIENT DOES NOT DRINK
AUDIT-C TOTAL SCORE: 0
AUDIT-C TOTAL SCORE: 0
HOW OFTEN DO YOU HAVE 6 OR MORE DRINKS ON ONE OCCASION: NEVER
HOW OFTEN DO YOU HAVE A DRINK CONTAINING ALCOHOL: NEVER

## 2024-06-27 ASSESSMENT — ACTIVITIES OF DAILY LIVING (ADL)
WALKS IN HOME: INDEPENDENT
HEARING - RIGHT EAR: FUNCTIONAL
BATHING: INDEPENDENT
GROOMING: INDEPENDENT
PATIENT'S MEMORY ADEQUATE TO SAFELY COMPLETE DAILY ACTIVITIES?: YES
JUDGMENT_ADEQUATE_SAFELY_COMPLETE_DAILY_ACTIVITIES: YES
LACK_OF_TRANSPORTATION: PATIENT DECLINED
FEEDING YOURSELF: INDEPENDENT
HEARING - LEFT EAR: FUNCTIONAL
ADEQUATE_TO_COMPLETE_ADL: YES
TOILETING: INDEPENDENT

## 2024-06-27 ASSESSMENT — PAIN DESCRIPTION - LOCATION: LOCATION: ABDOMEN

## 2024-06-27 ASSESSMENT — PAIN - FUNCTIONAL ASSESSMENT: PAIN_FUNCTIONAL_ASSESSMENT: 0-10

## 2024-06-27 NOTE — H&P
Obstetrical Admission History and Physical     Thi Carlos Mathias is a 29 y.o.  at 39w4d. CADEN: 2024, by Ultrasound. Estimated fetal weight: 7lbs. She has had prenatal care with [facility] .    Chief Complaint: Pregnancy Problem, Contractions, and Leakage/Loss of Fluid    Assessment/Plan    Admit to L&D for SROM  - SVE: fingertip/50/-3   - SSE: +pooling, nitrazine, ferning   - Routine admission labs sent   - Monitor vital signs per unit protocol  - Encourage frequent position changes  - Regular diet until pitocin, pt requested to eat   - Continuous fetal monitoring  - Continue assessment of maternal and fetal well-being  - Recheck as clinically indicted by maternal or fetal status  - Presentation: vertex , anticipate SVB  - Needs consented by L&D team     IUP at 39w4d   - NST reactive  - Good fetal movement  - EFW 7lbs   - GBS +, PCN ordered   - Pregnancy notables:  - Abnl 1 hr, nl 3 hr   - Bile acids collected  - WNL     Maternal Well-being  - Vital signs stable and WNL  - Emotional support and reassurance provided  - All questions and concerns addressed     D/w Dr. Monica Infante PA-C        Principal Problem:    Labor and delivery, indication for care ([facility])      Pregnancy Problems (from 24 to present)       Problem Noted Resolved    Labor and delivery, indication for care ([facility]) 2024 by Noemy Infante PA-C No    Priority:  Medium            Options for delivery have been discussed with the patient and she elects a vaginal delivery.  Labor has been discussed in detail. The risks, benefits, complications, alternatives, expected outcomes, potential problems during recuperation and recovery, and the risks of not performing the procedure were discussed with the patient. The patient stated understanding that the risks of delivery include, but are not limited to: death; reaction to medications; injury to bowel, bladder, ureters, uterus, cervix, vagina, and other pelvic and abdominal  structures, infection; blood loss and possible need for transfusion; and potential need for surgery, including hysterectomy. The risks of injury to the infant during delivery were also discussed. All questions were answered. The patient is wishing to continue with plans for a vaginal delivery.    Admit to inpatient status. I anticipate that this patient will require a stay exceeding at least 2 midnights for delivery and postpartum.  Active management of rupture of membranes.  Management of pregnancy complications, as indicated.    Subjective   Thi Carlos presents to Labor & Delivery with Spontaneous Rupture of Membranes of clear fluid at 0700 hr on . Good fetal movement. Denies vaginal bleeding. Having contractions q 5 minutes.     Obstetrical History   OB History    Para Term  AB Living   1         0   SAB IAB Ectopic Multiple Live Births                  # Outcome Date GA Lbr Gunnar/2nd Weight Sex Delivery Anes PTL Lv   1 Current                Past Medical History  History reviewed. No pertinent past medical history.     Past Surgical History   History reviewed. No pertinent surgical history.    Social History  Social History     Tobacco Use    Smoking status: Never     Passive exposure: Never    Smokeless tobacco: Never   Substance Use Topics    Alcohol use: Never     Substance and Sexual Activity   Drug Use Never       Allergies  Patient has no known allergies.     Medications  Medications Prior to Admission   Medication Sig Dispense Refill Last Dose    ferrous sulfate, 325 mg ferrous sulfate, tablet Take 1 tablet by mouth once daily with breakfast. 90 tablet 3     prenatal vitamin, iron-folic, 27 mg iron-800 mcg folic acid tablet Take 1 tablet by mouth once daily. 30 tablet 0        Objective    Last Vitals  Temp Pulse Resp BP MAP O2 Sat   36.4 °C (97.6 °F) 86 16 119/78   98 %     Physical Examination  GENERAL: Examination reveals a well developed, well nourished, gravid female in no acute  distress. She is alert and cooperative.  ABDOMEN: soft, gravid, nontender, nondistended, no abnormal masses, no epigastric pain  FHR is 135, mod variability, +accels, -decels   Westervelt reading:  ctx 2-4 mins   The fetus is in a vertex presentation, determined by ultrasound  Current Estimated Fetal Weight 7lbs established by leopolds   CERVIX:   fingertip cm dilated,   50% effaced,   -3 station; MEMBRANES are ruptured- +pooling, nitrazine, ferning   PSYCHOLOGICAL: awake and alert; oriented to person, place, and time    Lab Review  Labs in chart were reviewed.

## 2024-06-27 NOTE — PROGRESS NOTES
S: Pt asleep upon entering the room. Appears comfortable s/p epidural placement.     O: FHR 120bpm, mod variability, accels present, no decels  [place] q3-5min  SVE deferred   Pit infusing @4mu    A: IUP @39.4  ROM x11hrs  Cat 1 fht  GBS Positive    P: Continue up-titration of pitocin per protocol  Continue PCN q4hr  Will defer cervical exam until pt wakes up to promote rest  Anticipate SVB    ROSELYN Tee-CHAZ    Addendum - SVE 2/70/-2 at 1833  Continue present management

## 2024-06-27 NOTE — ANESTHESIA PREPROCEDURE EVALUATION
Patient: Meryl Mathias    Evaluation Method: In-person visit    Procedure Information    Anesthesia Start Date/Time: 06/27/24 3447  Procedure: Labor Analgesia         Relevant Problems   Anesthesia (within normal limits)       Clinical information reviewed:   Tobacco  Allergies  Meds   Med Hx  Surg Hx   Fam Hx  Soc Hx        NPO Detail:  No data recorded     OB/Gyn Evaluation    Present Pregnancy    Patient is pregnant now.   Obstetric History                Physical Exam    Airway  Mallampati: II  TM distance: >3 FB  Neck ROM: full     Cardiovascular - normal exam  Rhythm: regular  Rate: normal     Dental - normal exam     Pulmonary   Breath sounds clear to auscultation     Abdominal   Abdomen: soft  Bowel sounds: normal           Anesthesia Plan    History of general anesthesia?: no  History of complications of general anesthesia?: no    ASA 2     epidural     The patient is not a current smoker.    Postoperative administration of opioids is intended.  Anesthetic plan and risks discussed with patient.  Use of blood products discussed with patient who.    Plan discussed with attending.

## 2024-06-27 NOTE — NURSING NOTE
Interpretive services were used to communicate with Meryl Mathias. The type of service used was MARTTI system. This RN present for whole conversation. Translating takes 1hr 30m+ to discuss everything in detail.     ID #: [id number] used for everything within this note.    Discussing penicillin, feeling of burning, and what gbs is. Treatment pencillin q4h.     Discussing pain control, pain mangement, heat packs, turning positions, meds (nitrous, nubaine, epidural), calling for help, asking for bathroom/water/etc. Hand motions created in the effort to save time in case of emergency.     Patient eating at this time.    This RN discusses oxytocin/Pitocin for labor and numeric pain scale- patient to show on fingers, PP pitocin, goal of labor pitocin with contractions.     Epidural discussed, how it's done, how it should feel, how quickly it works, what it should cover (not pressure). Pt declines any heat pack of pain meds at this time, rating pain 3/10.     10:33 AM  Linton  at bedside assessing patient.    Patient signing consents. Patient signs for labor, CS, blood transfusion, or general anesthesia. Patient signs for pediatric care once baby is born and for treatment at delivery. No other questions or concerns.    This RN educated on delivery resuscitation of infant, delayed cord clamping, skin to skin, cutting the cord and more. Patient signing consents for .    CNINDIA CandelariaLinton to return and assess cervix for vaginal delivery.

## 2024-06-27 NOTE — SIGNIFICANT EVENT
Pitocin was initially delayed due to unit acuity. Nursing staff is now available to titrate pitocin, however pt is requesting to eat lunch first. She is aware that delaying pitocin may increase her infection risk in the setting of ruptured membranes. Will begin pitocin once she finishes eating.     ROSELYN Tee-CHAZ

## 2024-06-27 NOTE — ANESTHESIA PREPROCEDURE EVALUATION
Patient: Meryl Mathias    Evaluation Method: Chart review    Procedure Information    Date: 06/27/24  Procedure: Labor Consult         Relevant Problems   Cardiac (within normal limits)      Pulmonary (within normal limits)      Neuro (within normal limits)      /Renal (within normal limits)      Liver (within normal limits)      Endocrine (within normal limits)       Clinical information reviewed:   Tobacco  Allergies  Meds   Med Hx  Surg Hx   Fam Hx  Soc Hx        NPO Detail:  No data recorded     OB/Gyn Evaluation    Present Pregnancy    Patient is pregnant now.   Obstetric History                Physical Exam    Airway  Mallampati: III  TM distance: >3 FB  Neck ROM: full     Cardiovascular   Rhythm: regular     Dental - normal exam     Pulmonary   Breath sounds clear to auscultation     Abdominal   Abdomen: soft  Bowel sounds: normal           Anesthesia Plan    History of general anesthesia?: no  History of complications of general anesthesia?: no    ASA 2     epidural     The patient is not a current smoker.    Postoperative administration of opioids is intended.  Anesthetic plan and risks discussed with patient.  Use of blood products discussed with patient who.    Plan discussed with attending.

## 2024-06-27 NOTE — ANESTHESIA PROCEDURE NOTES
Epidural Block    Patient location during procedure: OB  Start time: 6/27/2024 4:37 PM  Reason for block: labor analgesia  Staffing  Performed: resident   Authorized by: Nancy Moore DO    Performed by: Nancy Moore DO    Preanesthetic Checklist  Completed: patient identified, IV checked, risks and benefits discussed, surgical consent, pre-op evaluation, timeout performed and sterile techniques followed  Block Timeout  RN/Licensed healthcare professional reads aloud to the Anesthesia provider and entire team: Patient identity, procedure with side and site, patient position, and as applicable the availability of implants/special equipment/special requirements.  Patient on coagulant treatment: no  Timeout performed at: 6/27/2024 4:41 PM  Block Placement  Patient position: sitting  Prep: ChloraPrep  Sterility prep: cap, drape, gloves, hand and mask  Sedation level: no sedation  Patient monitoring: blood pressure, continuous pulse oximetry and heart rate  Approach: midline  Local numbing: lidocaine 1% to skin and subcutaneous tissues  Vertebral space: lumbar  Lumbar location: L2-L3  Epidural  Loss of resistance technique: saline  Guidance: landmark technique        Needle  Needle type: Tuohy   Needle gauge: 18  Needle length: 8.9cm  Needle insertion depth: 5 cm  Catheter type: multi-orifice  Catheter size: 22 G  Catheter at skin depth: 10 cm  Catheter securement method: clear occlusive dressing and liquid medical adhesive    Test dose: lidocaine 1.5% with epinephrine 1-to-200,000  Test dose: lidocaine 1.5% with epinephrine 1-to-200,000  Test dose result: no positive test dose    PCEA  Medication concentration used: 0.044% Bupivacaine with 1.25 mcg/mL Fentanyl and 1:079230 Epinephrine  Dose (mL): 10  Lockout (minutes): 15  1-Hour Limit (boluses/hr): 4  Basal Rate: 14        Assessment  Sensory level: T10 bilateral  Block outcome: patient comfortable  Number of attempts: 2 (Encountered bone on first attempt.  Second attempt successful.)  Events: no positive test dose  Procedure assessment: patient tolerated procedure well with no immediate complications

## 2024-06-27 NOTE — NURSING NOTE
ID# [id number]    Interpretive services were used to communicate with Meryl Mathias. The type of service used was [employer] system.    1125am Royer at bedside to assess patient and cervix. Cervical change.  0.5cm to 1cm. Contrx 2-4 moderate. Pain 3/10    Discussing start of pitocin. Discussing risk of infection with SROM. Patient prefers to wait a little longer before starting pitocin. To start pitocin when in new room as she is uncomfortable.    1300  Patient in labor rm 18. Patient declines starting pitocin at this time as she is hungry and desires lunch, Patient educated on infection risk and our POC when Royer WARE was at bedside. Patient would still like to eat first. Order placed and should arrive at 1400. To start pitocin when done eating. Royer WARE notified.    Visitor policy discussed for when in labor and on postpartum.

## 2024-06-27 NOTE — SIGNIFICANT EVENT
Used interpretor services throughout interaction.    S: Introduced self to Meryl and her  as midwife today. Meryl endorses irregular contractions since her arrival, rated as a 3/10 severity.     O: FHR 130bpm, mod variability, + accels, no decels  [place] q3-6min  SVE 1/50/-2  EFW 6.5# by Leopold's    A: IUP @ 39.4  ROM x5hrs  Cat 1 fht  GBS Positive    P: Labor consent signed with patient, all questions answered.   In setting of slight cervical change, discussed option for cervical recheck in 4 hours to evaluate for spontaneous labor, versus beginning oxytocin augmentation now. Pt opts for oxytocin augmentation now. Order placed.   Will recheck cervix as clinically indicated.   Labor analgesia PRN  Anticipate SVB    SUSANNA Tee

## 2024-06-28 LAB
ABO GROUP (TYPE) IN BLOOD: NORMAL
ANTIBODY SCREEN: NORMAL
APTT PPP: 31 SECONDS (ref 27–38)
BASOPHILS # BLD AUTO: 0.05 X10*3/UL (ref 0–0.1)
BASOPHILS NFR BLD AUTO: 0.2 %
C TRACH RRNA SPEC QL NAA+PROBE: NEGATIVE
EOSINOPHIL # BLD AUTO: 0.02 X10*3/UL (ref 0–0.7)
EOSINOPHIL NFR BLD AUTO: 0.1 %
ERYTHROCYTE [DISTWIDTH] IN BLOOD BY AUTOMATED COUNT: 13.3 % (ref 11.5–14.5)
FIBRINOGEN PPP-MCNC: 418 MG/DL (ref 200–400)
HCT VFR BLD AUTO: 32.7 % (ref 36–46)
HGB BLD-MCNC: 10.4 G/DL (ref 12–16)
IMM GRANULOCYTES # BLD AUTO: 0.22 X10*3/UL (ref 0–0.7)
IMM GRANULOCYTES NFR BLD AUTO: 1.1 % (ref 0–0.9)
INR PPP: 0.9 (ref 0.9–1.1)
LYMPHOCYTES # BLD AUTO: 0.97 X10*3/UL (ref 1.2–4.8)
LYMPHOCYTES NFR BLD AUTO: 4.6 %
MCH RBC QN AUTO: 27.4 PG (ref 26–34)
MCHC RBC AUTO-ENTMCNC: 31.8 G/DL (ref 32–36)
MCV RBC AUTO: 86 FL (ref 80–100)
MONOCYTES # BLD AUTO: 0.93 X10*3/UL (ref 0.1–1)
MONOCYTES NFR BLD AUTO: 4.4 %
N GONORRHOEA DNA SPEC QL PROBE+SIG AMP: NEGATIVE
NEUTROPHILS # BLD AUTO: 18.72 X10*3/UL (ref 1.2–7.7)
NEUTROPHILS NFR BLD AUTO: 89.6 %
NRBC BLD-RTO: 0 /100 WBCS (ref 0–0)
PLATELET # BLD AUTO: 184 X10*3/UL (ref 150–450)
PROTHROMBIN TIME: 10.4 SECONDS (ref 9.8–12.8)
RBC # BLD AUTO: 3.8 X10*6/UL (ref 4–5.2)
RH FACTOR (ANTIGEN D): NORMAL
WBC # BLD AUTO: 20.9 X10*3/UL (ref 4.4–11.3)

## 2024-06-28 PROCEDURE — 2500000004 HC RX 250 GENERAL PHARMACY W/ HCPCS (ALT 636 FOR OP/ED): Mod: JZ | Performed by: STUDENT IN AN ORGANIZED HEALTH CARE EDUCATION/TRAINING PROGRAM

## 2024-06-28 PROCEDURE — 86900 BLOOD TYPING SEROLOGIC ABO: CPT | Performed by: STUDENT IN AN ORGANIZED HEALTH CARE EDUCATION/TRAINING PROGRAM

## 2024-06-28 PROCEDURE — 87491 CHLMYD TRACH DNA AMP PROBE: CPT | Performed by: STUDENT IN AN ORGANIZED HEALTH CARE EDUCATION/TRAINING PROGRAM

## 2024-06-28 PROCEDURE — 2500000005 HC RX 250 GENERAL PHARMACY W/O HCPCS

## 2024-06-28 PROCEDURE — 2500000004 HC RX 250 GENERAL PHARMACY W/ HCPCS (ALT 636 FOR OP/ED)

## 2024-06-28 PROCEDURE — 88307 TISSUE EXAM BY PATHOLOGIST: CPT | Performed by: STUDENT IN AN ORGANIZED HEALTH CARE EDUCATION/TRAINING PROGRAM

## 2024-06-28 PROCEDURE — 2500000004 HC RX 250 GENERAL PHARMACY W/ HCPCS (ALT 636 FOR OP/ED): Performed by: STUDENT IN AN ORGANIZED HEALTH CARE EDUCATION/TRAINING PROGRAM

## 2024-06-28 PROCEDURE — 85610 PROTHROMBIN TIME: CPT | Performed by: STUDENT IN AN ORGANIZED HEALTH CARE EDUCATION/TRAINING PROGRAM

## 2024-06-28 PROCEDURE — 86901 BLOOD TYPING SEROLOGIC RH(D): CPT | Performed by: STUDENT IN AN ORGANIZED HEALTH CARE EDUCATION/TRAINING PROGRAM

## 2024-06-28 PROCEDURE — 85730 THROMBOPLASTIN TIME PARTIAL: CPT | Performed by: STUDENT IN AN ORGANIZED HEALTH CARE EDUCATION/TRAINING PROGRAM

## 2024-06-28 PROCEDURE — 59410 OBSTETRICAL CARE: CPT | Performed by: STUDENT IN AN ORGANIZED HEALTH CARE EDUCATION/TRAINING PROGRAM

## 2024-06-28 PROCEDURE — 88307 TISSUE EXAM BY PATHOLOGIST: CPT | Mod: TC,SUR | Performed by: STUDENT IN AN ORGANIZED HEALTH CARE EDUCATION/TRAINING PROGRAM

## 2024-06-28 PROCEDURE — 7210000002 HC LABOR PER HOUR

## 2024-06-28 PROCEDURE — 51701 INSERT BLADDER CATHETER: CPT

## 2024-06-28 PROCEDURE — 1100000001 HC PRIVATE ROOM DAILY

## 2024-06-28 PROCEDURE — 0KQM0ZZ REPAIR PERINEUM MUSCLE, OPEN APPROACH: ICD-10-PCS | Performed by: OBSTETRICS & GYNECOLOGY

## 2024-06-28 PROCEDURE — 2500000001 HC RX 250 WO HCPCS SELF ADMINISTERED DRUGS (ALT 637 FOR MEDICARE OP): Performed by: STUDENT IN AN ORGANIZED HEALTH CARE EDUCATION/TRAINING PROGRAM

## 2024-06-28 PROCEDURE — 85025 COMPLETE CBC W/AUTO DIFF WBC: CPT | Performed by: STUDENT IN AN ORGANIZED HEALTH CARE EDUCATION/TRAINING PROGRAM

## 2024-06-28 PROCEDURE — 85384 FIBRINOGEN ACTIVITY: CPT | Performed by: STUDENT IN AN ORGANIZED HEALTH CARE EDUCATION/TRAINING PROGRAM

## 2024-06-28 PROCEDURE — 2500000002 HC RX 250 W HCPCS SELF ADMINISTERED DRUGS (ALT 637 FOR MEDICARE OP, ALT 636 FOR OP/ED)

## 2024-06-28 PROCEDURE — 3E0H7GC INTRODUCTION OF OTHER THERAPEUTIC SUBSTANCE INTO LOWER GI, VIA NATURAL OR ARTIFICIAL OPENING: ICD-10-PCS | Performed by: OBSTETRICS & GYNECOLOGY

## 2024-06-28 PROCEDURE — 7100000016 HC LABOR RECOVERY PER HOUR

## 2024-06-28 RX ORDER — DIPHENHYDRAMINE HYDROCHLORIDE 50 MG/ML
25 INJECTION INTRAMUSCULAR; INTRAVENOUS EVERY 6 HOURS PRN
Status: DISCONTINUED | OUTPATIENT
Start: 2024-06-28 | End: 2024-06-30 | Stop reason: HOSPADM

## 2024-06-28 RX ORDER — MISOPROSTOL 200 UG/1
800 TABLET ORAL ONCE AS NEEDED
Status: DISCONTINUED | OUTPATIENT
Start: 2024-06-28 | End: 2024-06-30 | Stop reason: HOSPADM

## 2024-06-28 RX ORDER — NIFEDIPINE 10 MG/1
10 CAPSULE ORAL ONCE AS NEEDED
Status: DISCONTINUED | OUTPATIENT
Start: 2024-06-28 | End: 2024-06-30 | Stop reason: HOSPADM

## 2024-06-28 RX ORDER — OXYTOCIN 10 [USP'U]/ML
10 INJECTION, SOLUTION INTRAMUSCULAR; INTRAVENOUS ONCE AS NEEDED
Status: DISCONTINUED | OUTPATIENT
Start: 2024-06-28 | End: 2024-06-30 | Stop reason: HOSPADM

## 2024-06-28 RX ORDER — BISACODYL 10 MG/1
10 SUPPOSITORY RECTAL DAILY PRN
Status: DISCONTINUED | OUTPATIENT
Start: 2024-06-28 | End: 2024-06-30 | Stop reason: HOSPADM

## 2024-06-28 RX ORDER — ONDANSETRON 4 MG/1
4 TABLET, FILM COATED ORAL EVERY 6 HOURS PRN
Status: DISCONTINUED | OUTPATIENT
Start: 2024-06-28 | End: 2024-06-30 | Stop reason: HOSPADM

## 2024-06-28 RX ORDER — ADHESIVE BANDAGE
10 BANDAGE TOPICAL
Status: DISCONTINUED | OUTPATIENT
Start: 2024-06-28 | End: 2024-06-30 | Stop reason: HOSPADM

## 2024-06-28 RX ORDER — IBUPROFEN 600 MG/1
600 TABLET ORAL EVERY 6 HOURS
Status: DISCONTINUED | OUTPATIENT
Start: 2024-06-28 | End: 2024-06-30 | Stop reason: HOSPADM

## 2024-06-28 RX ORDER — LIDOCAINE 560 MG/1
1 PATCH PERCUTANEOUS; TOPICAL; TRANSDERMAL
Status: DISCONTINUED | OUTPATIENT
Start: 2024-06-28 | End: 2024-06-30 | Stop reason: HOSPADM

## 2024-06-28 RX ORDER — METHYLERGONOVINE MALEATE 0.2 MG/ML
0.2 INJECTION INTRAVENOUS ONCE AS NEEDED
Status: DISCONTINUED | OUTPATIENT
Start: 2024-06-28 | End: 2024-06-30 | Stop reason: HOSPADM

## 2024-06-28 RX ORDER — OXYTOCIN/0.9 % SODIUM CHLORIDE 30/500 ML
60 PLASTIC BAG, INJECTION (ML) INTRAVENOUS ONCE AS NEEDED
Status: DISCONTINUED | OUTPATIENT
Start: 2024-06-28 | End: 2024-06-30 | Stop reason: HOSPADM

## 2024-06-28 RX ORDER — LOPERAMIDE HYDROCHLORIDE 2 MG/1
4 CAPSULE ORAL EVERY 2 HOUR PRN
Status: DISCONTINUED | OUTPATIENT
Start: 2024-06-28 | End: 2024-06-30 | Stop reason: HOSPADM

## 2024-06-28 RX ORDER — POLYETHYLENE GLYCOL 3350 17 G/17G
17 POWDER, FOR SOLUTION ORAL 2 TIMES DAILY PRN
Status: DISCONTINUED | OUTPATIENT
Start: 2024-06-28 | End: 2024-06-30 | Stop reason: HOSPADM

## 2024-06-28 RX ORDER — TRANEXAMIC ACID 100 MG/ML
1000 INJECTION, SOLUTION INTRAVENOUS ONCE AS NEEDED
Status: DISCONTINUED | OUTPATIENT
Start: 2024-06-28 | End: 2024-06-30 | Stop reason: HOSPADM

## 2024-06-28 RX ORDER — CARBOPROST TROMETHAMINE 250 UG/ML
250 INJECTION, SOLUTION INTRAMUSCULAR ONCE AS NEEDED
Status: DISCONTINUED | OUTPATIENT
Start: 2024-06-28 | End: 2024-06-30 | Stop reason: HOSPADM

## 2024-06-28 RX ORDER — LABETALOL HYDROCHLORIDE 5 MG/ML
20 INJECTION, SOLUTION INTRAVENOUS ONCE AS NEEDED
Status: DISCONTINUED | OUTPATIENT
Start: 2024-06-28 | End: 2024-06-30 | Stop reason: HOSPADM

## 2024-06-28 RX ORDER — CLINDAMYCIN PHOSPHATE 900 MG/50ML
900 INJECTION, SOLUTION INTRAVENOUS EVERY 8 HOURS
Status: DISCONTINUED | OUTPATIENT
Start: 2024-06-28 | End: 2024-06-29

## 2024-06-28 RX ORDER — HYDRALAZINE HYDROCHLORIDE 20 MG/ML
5 INJECTION INTRAMUSCULAR; INTRAVENOUS ONCE AS NEEDED
Status: DISCONTINUED | OUTPATIENT
Start: 2024-06-28 | End: 2024-06-30 | Stop reason: HOSPADM

## 2024-06-28 RX ORDER — ACETAMINOPHEN 325 MG/1
975 TABLET ORAL EVERY 6 HOURS
Status: DISCONTINUED | OUTPATIENT
Start: 2024-06-28 | End: 2024-06-30 | Stop reason: HOSPADM

## 2024-06-28 RX ORDER — SIMETHICONE 80 MG
80 TABLET,CHEWABLE ORAL 4 TIMES DAILY PRN
Status: DISCONTINUED | OUTPATIENT
Start: 2024-06-28 | End: 2024-06-30 | Stop reason: HOSPADM

## 2024-06-28 RX ORDER — DIPHENHYDRAMINE HCL 25 MG
25 CAPSULE ORAL EVERY 6 HOURS PRN
Status: DISCONTINUED | OUTPATIENT
Start: 2024-06-28 | End: 2024-06-30 | Stop reason: HOSPADM

## 2024-06-28 RX ORDER — ONDANSETRON HYDROCHLORIDE 2 MG/ML
4 INJECTION, SOLUTION INTRAVENOUS EVERY 6 HOURS PRN
Status: DISCONTINUED | OUTPATIENT
Start: 2024-06-28 | End: 2024-06-30 | Stop reason: HOSPADM

## 2024-06-28 RX ADMIN — AMPICILLIN SODIUM 2 G: 2 INJECTION, POWDER, FOR SOLUTION INTRAMUSCULAR; INTRAVENOUS at 16:12

## 2024-06-28 RX ADMIN — IBUPROFEN 600 MG: 600 TABLET, FILM COATED ORAL at 17:17

## 2024-06-28 RX ADMIN — MISOPROSTOL 800 MCG: 200 TABLET ORAL at 03:50

## 2024-06-28 RX ADMIN — ACETAMINOPHEN 975 MG: 325 TABLET ORAL at 17:17

## 2024-06-28 RX ADMIN — IBUPROFEN 600 MG: 600 TABLET, FILM COATED ORAL at 05:08

## 2024-06-28 RX ADMIN — CLINDAMYCIN PHOSPHATE 900 MG: 900 INJECTION, SOLUTION INTRAVENOUS at 14:54

## 2024-06-28 RX ADMIN — PENICILLIN G 3 MILLION UNITS: 3000000 INJECTION, SOLUTION INTRAVENOUS at 01:44

## 2024-06-28 RX ADMIN — CLINDAMYCIN PHOSPHATE 900 MG: 900 INJECTION, SOLUTION INTRAVENOUS at 05:07

## 2024-06-28 RX ADMIN — METHYLERGONOVINE MALEATE 0.2 MG: 0.2 INJECTION, SOLUTION INTRAMUSCULAR; INTRAVENOUS at 03:34

## 2024-06-28 RX ADMIN — TRANEXAMIC ACID 1000 MG: 100 INJECTION INTRAVENOUS at 03:36

## 2024-06-28 RX ADMIN — GENTAMICIN SULFATE 400 MG: 40 INJECTION, SOLUTION INTRAMUSCULAR; INTRAVENOUS at 08:20

## 2024-06-28 RX ADMIN — ACETAMINOPHEN 975 MG: 325 TABLET ORAL at 10:55

## 2024-06-28 RX ADMIN — CLINDAMYCIN PHOSPHATE 900 MG: 900 INJECTION, SOLUTION INTRAVENOUS at 23:30

## 2024-06-28 RX ADMIN — IBUPROFEN 600 MG: 600 TABLET, FILM COATED ORAL at 10:55

## 2024-06-28 RX ADMIN — ACETAMINOPHEN 975 MG: 325 TABLET ORAL at 05:08

## 2024-06-28 RX ADMIN — AMPICILLIN SODIUM 2 G: 2 INJECTION, POWDER, FOR SOLUTION INTRAMUSCULAR; INTRAVENOUS at 22:55

## 2024-06-28 ASSESSMENT — PAIN DESCRIPTION - LOCATION: LOCATION: PERINEUM

## 2024-06-28 ASSESSMENT — PAIN DESCRIPTION - DESCRIPTORS: DESCRIPTORS: ACHING

## 2024-06-28 ASSESSMENT — PAIN SCALES - GENERAL
PAINLEVEL_OUTOF10: 0 - NO PAIN
PAINLEVEL_OUTOF10: 0 - NO PAIN
PAINLEVEL_OUTOF10: 4
PAINLEVEL_OUTOF10: 0 - NO PAIN
PAINLEVEL_OUTOF10: 3
PAINLEVEL_OUTOF10: 3
PAINLEVEL_OUTOF10: 0 - NO PAIN
PAINLEVEL_OUTOF10: 3

## 2024-06-28 NOTE — PROGRESS NOTES
"Postpartum Progress Note    Assessment/Plan   Pt is a 28yo , who delivered at 39.5wga gestation and is now postpartum day 0 complicated by pp endometritis.    Routine postpartum care, and postpartum endometritis  -  VAVD, EBL 1L, bl sulcal and 2nd deg lacs repaired, methergine, TXA, rectal cyto. Vag pack + jack placed 0400, now both removed without issue  - PP endometritis with fever, now resolved sp gent/clinda, WBC 20.9  - patient meeting milestones appropriately   - advance diet and activity as tolerated   - pain management with ibuprofen q6h and tylenol q6h    Breastfeeding   - continue attempting to pump; recommend lactation consultation.     Maternal well-being: no signs of post partum depression at this time    Joseph Singh, PGY3  Seen and discussed with Monica Heaton   Her pain is well controlled with current medications  She is passing flatus  She is ambulating well  She is tolerating a Adult diet Regular  She reports no breast or nursing problems  She denies emotional concerns today   No f/c    Objective     Vitals   Visit Vitals  /64   Pulse 81   Temp 37.1 °C (98.8 °F) (Temporal)   Resp 16   Ht 1.575 m (5' 2\")   Wt 73.9 kg (163 lb)   SpO2 95%   Breastfeeding Yes   BMI 29.81 kg/m²   OB Status Recent pregnancy   Smoking Status Never   BSA 1.8 m²       Intake/Output:       Intake/Output Summary (Last 24 hours) at 2024 1611  Last data filed at 2024 1210  Gross per 24 hour   Intake 3187 ml   Output 3158 ml   Net 29 ml           Physical Exam:  General: Examination reveals a well developed, well nourished, female, in no acute distress. She is alert and cooperative.  Lungs: clear to auscultation bilaterally.  Cardiac: regular rate and rhythm, S1, S2 normal, no murmur, click, rub or gallop.  Abdomen:  nontender to palpation .  Fundus: firm, below umbilicus, and nontender.  Extremities: no redness or tenderness in the calves or thighs, no edema.  Psychological: awake and alert; " oriented to person, place, and time.    Lab Data:  Lab Results   Component Value Date    WBC 20.9 (H) 06/28/2024    HGB 10.4 (L) 06/28/2024    HCT 32.7 (L) 06/28/2024     06/28/2024

## 2024-06-28 NOTE — SIGNIFICANT EVENT
2hrs from prior exam in active labor  SVE now 9.5/100/+1, making cervical change. Continue pit per protocol  CEFM, baseline 140, moderate variability, accels present, no decels - Cat 1    D/w Dr. Aaron Mast MD  PGY-4, Obstetrics and Gynecology

## 2024-06-28 NOTE — PROGRESS NOTES
Intrapartum Progress Note    Assessment/Plan   Meryl Mathias is a 29 y.o.  at 39w5d. CADEN: 2024, by Ultrasound.     PROM, IOL  - Now active labor  - Continue pit per protocol  - Epidural in place    Fetal well-being  - GBS pos, PCN  - CEFM, cat 1    D/w Dr. Aaron Mast MD  PGY-4, Obstetrics and Gynecology    Principal Problem:    Labor and delivery, indication for care ([facility])    Pregnancy Problems (from 24 to present)       Problem Noted Resolved    Labor and delivery, indication for care ([facility]) 2024 by Noemy Infante PA-C No    Priority:  Medium              Subjective   Pt transferred back to MD care from [facility] care on this shift. To bedside to eval d/t increased pressure     Objective   Last Vitals:  Temp Pulse Resp BP MAP Pulse Ox   37.3 °C (99.2 °F) 89 16 111/66   97 %     Vitals Min/Max Last 24 Hours:  Temp  Min: 36 °C (96.8 °F)  Max: 38.3 °C (101 °F)  Pulse  Min: 63  Max: 150  Resp  Min: 14  Max: 18  BP  Min: 95/58  Max: 142/82    Intake/Output:    Intake/Output Summary (Last 24 hours) at 2024 0906  Last data filed at 2024 0757  Gross per 24 hour   Intake 3712 ml   Output 2784 ml   Net 928 ml       Physical Examination:  General Appearance: no acute distress  Skin: no rashes or lesions appreciated  Lungs: normal work of breathing  Heart: warm, well perfused  Abdomen: gravid  Extremities: no edema appreciated  Musculoskeletal: normal ROM  Neurologic: no gross deficits  Psych exam: normal mood and affect    Cervical Exam  Dilation: 8  Effacement (%): 100  Fetal Station: -1    NST: baseline 130, moderate variability, accels present, no decels   Contraction Frequency: 1-3    Lab Review:  Labs in chart were reviewed.

## 2024-06-28 NOTE — DISCHARGE INSTRUCTIONS

## 2024-06-28 NOTE — SIGNIFICANT EVENT
Postpartum endometritis    Informed by RN that pt now has temp >38. To bedside to eval    VS otherwise wnl without tachy; BP wnl    General Appearance: no acute distress  Skin: no rashes or lesions appreciated  Lungs: normal work of breathing  Heart: warm, well perfused  Abdomen: soft, fundus firm, nontender  Extremities: no edema appreciated  Musculoskeletal: normal ROM  Neurologic: no gross deficits  Psych exam: normal mood and affect    A/P:  - Temp most concerning for pp endometritis. No other focal infectious findings  - Low c/f sepsis based on normal vitals  - Will txt with gent/clinda. Tylenol for temp  - CBC with diff at 8am  - Barrera and vag pack remain in place; low concern that these are source of infection given they were placed <1hr ago  - Dx communicated with patient via     D/w Dr. Rafael Mast MD  PGY-4, Obstetrics and Gynecology

## 2024-06-28 NOTE — L&D DELIVERY NOTE
OB Delivery Note  2024  Meryl Gonzalez Mey  29 y.o.   Vaginal, Vacuum (Extractor)      Pt presented for PROM at 39w4d, subsequently underwent IOL -. Underwent VAVD , EBL 1000cc, complicated by the following. Early in second stage, position noted to be LOP, destationed and rotated to direct OA after multiple attempts. There was FHT decel at 0248, subsequently recovered and remained reassuring while pushing. Additionally, noted to have significant bleeding during second stage, approx 500cc. After almost 1hr pushing with good effort, minimal descent noted. In setting of significant bleeding and prior FHT decel, decision made to proceed with VAVD. Patient counseled via  on risks including fetal scalp hematoma and higher degree lacerations. Pt consented to VAVD. Kiwi vacuum was applied in the standard fashion and suction was obtained. Along with good maternal pushing effort, fetus was delivered in 3 min after 3 pulls with no pop-offs. Subsequently, a 2nd degree lac was noted; this was repaired in the standard fashion with 2-0 Vicryl. Further bleeding was noted and on exam there was mild LUCIANA atony, methergine was given. At this time, bilateral sulcal lacs were noted; these were repaired with running locked stitches of 2-0 Monocryl. A periurethral lac was repaired with several figure of 8s of 3-0 Monocryl. Continued oozing was noted, TXA was given. Decision was made to place a vaginal pack, this was placed and a Barrera was placed as well, the vag pack was tied to the Barrera. Rectal cytotec was placed. At the end of the procedure, uterine tone was good and bleeding was minimal. Dr. Walker was present for the entire procedure. Placenta to path for r/o abruption given pre-delivery bleeding    Gestational Age: 39w5d  /Para:   Quantitative Blood Loss: Admission to Discharge: 1382 mL (2024  8:10 AM - 2024  8:53 AM)    Mey Meryl Nolan [[id number]]      Labor Events    Sac identifier: Sac  1  Rupture type: Spontaneous  Fluid color: Clear  Fluid odor: None  Labor type: Induced Onset of Labor  Labor allowed to proceed with plans for an attempted vaginal birth?: Yes  Induction: Oxytocin  Induction date/time: 2024 0924  Induction indications: Premature ROM  Complications: None       Labor Event Times    Labor onset date/time: 2024 0700  Dilation complete date/time: 2024  Start pushing date/time: 2024       Labor Length    1st stage: 19h 20m  2nd stage: 0h 58m  3rd stage: 0h 02m       Placenta    Placenta delivery date/time: 2024 0320  Placenta removal: Spontaneous  Placenta appearance: Intact  Placenta disposition: pathology       Cord    Vessels: 3 vessels  Complications: Nuchal  Nuchal intervention: reduced  Nuchal cord description: loose nuchal cord  Number of loops: 1  Delayed cord clamping?: Yes  Cord clamped date/time: 2024  Cord blood disposition: Lab  Gases sent?: No  Stem cell collection (by provider): No       Lacerations    Episiotomy: None  Perineal laceration: 2nd  Perineal laceration repaired?: Yes  Sulcal laceration?: Yes  Sulcal laceration location: bilateral  Sulcal laceration repaired?: Yes  Repair suture: 2-0 Synthetic Suture, 3-0 Monocryl       Anesthesia    Method: Epidural       Operative Delivery    Forceps attempted?: No  Vacuum extractor attempted?: Yes  Vacuum indications: Maternal Fatigue, Prolonged Labor  Vacuum type: Kiwi  Vacuum application location: Low  Number of pop offs: 0  Number of pulls with vacuum: 3  Failed vacuum delivery?: No       Shoulder Dystocia    Shoulder dystocia present?: No       [place] Delivery    Birth date/time: 2024 03:18:00  Delivery type: Vaginal, Vacuum (Extractor)  Complications: None       Resuscitation    Method: Tactile stimulation       Apgars    Living status: Living  Apgar Component Scores:  1 min.:  5 min.:  10 min.:  15 min.:  20 min.:    Skin color:  0  1       Heart rate:  2  2        Reflex irritability:  2  2       Muscle tone:  2  2       Respiratory effort:  2  2       Total:  8  9       Apgars assigned by: JAKE ABEBE       Delivery Providers    Delivering clinician: Stephanie Walker MD   Provider Role    Keisha Johansen, RN Delivery Nurse    Christine Cabezas, RN Nursery Nurse    Milagro Mast MD Resident    Joseph Chavez, RN Nursery Nurse               Milagro Mast MD  PGY-4, Obstetrics and Gynecology

## 2024-06-29 LAB
APTT PPP: 26 SECONDS (ref 27–38)
ERYTHROCYTE [DISTWIDTH] IN BLOOD BY AUTOMATED COUNT: 13.7 % (ref 11.5–14.5)
FIBRINOGEN PPP-MCNC: 537 MG/DL (ref 200–400)
HCT VFR BLD AUTO: 28.3 % (ref 36–46)
HGB BLD-MCNC: 8.9 G/DL (ref 12–16)
INR PPP: 0.9 (ref 0.9–1.1)
MCH RBC QN AUTO: 27.5 PG (ref 26–34)
MCHC RBC AUTO-ENTMCNC: 31.4 G/DL (ref 32–36)
MCV RBC AUTO: 87 FL (ref 80–100)
NRBC BLD-RTO: 0 /100 WBCS (ref 0–0)
PLATELET # BLD AUTO: 203 X10*3/UL (ref 150–450)
PROTHROMBIN TIME: 10.4 SECONDS (ref 9.8–12.8)
RBC # BLD AUTO: 3.24 X10*6/UL (ref 4–5.2)
WBC # BLD AUTO: 17.3 X10*3/UL (ref 4.4–11.3)

## 2024-06-29 PROCEDURE — 85384 FIBRINOGEN ACTIVITY: CPT | Performed by: NURSE PRACTITIONER

## 2024-06-29 PROCEDURE — 2500000001 HC RX 250 WO HCPCS SELF ADMINISTERED DRUGS (ALT 637 FOR MEDICARE OP): Performed by: STUDENT IN AN ORGANIZED HEALTH CARE EDUCATION/TRAINING PROGRAM

## 2024-06-29 PROCEDURE — 85730 THROMBOPLASTIN TIME PARTIAL: CPT | Performed by: NURSE PRACTITIONER

## 2024-06-29 PROCEDURE — 1100000001 HC PRIVATE ROOM DAILY

## 2024-06-29 PROCEDURE — 2500000004 HC RX 250 GENERAL PHARMACY W/ HCPCS (ALT 636 FOR OP/ED): Performed by: STUDENT IN AN ORGANIZED HEALTH CARE EDUCATION/TRAINING PROGRAM

## 2024-06-29 PROCEDURE — 36415 COLL VENOUS BLD VENIPUNCTURE: CPT | Performed by: NURSE PRACTITIONER

## 2024-06-29 PROCEDURE — 85027 COMPLETE CBC AUTOMATED: CPT | Performed by: NURSE PRACTITIONER

## 2024-06-29 PROCEDURE — 85610 PROTHROMBIN TIME: CPT | Performed by: NURSE PRACTITIONER

## 2024-06-29 PROCEDURE — 2500000004 HC RX 250 GENERAL PHARMACY W/ HCPCS (ALT 636 FOR OP/ED): Mod: JZ | Performed by: STUDENT IN AN ORGANIZED HEALTH CARE EDUCATION/TRAINING PROGRAM

## 2024-06-29 RX ADMIN — ACETAMINOPHEN 975 MG: 325 TABLET ORAL at 06:31

## 2024-06-29 RX ADMIN — ACETAMINOPHEN 975 MG: 325 TABLET ORAL at 12:18

## 2024-06-29 RX ADMIN — AMPICILLIN SODIUM 2 G: 2 INJECTION, POWDER, FOR SOLUTION INTRAMUSCULAR; INTRAVENOUS at 05:40

## 2024-06-29 RX ADMIN — IBUPROFEN 600 MG: 600 TABLET, FILM COATED ORAL at 06:31

## 2024-06-29 RX ADMIN — IBUPROFEN 600 MG: 600 TABLET, FILM COATED ORAL at 00:34

## 2024-06-29 RX ADMIN — CLINDAMYCIN PHOSPHATE 900 MG: 900 INJECTION, SOLUTION INTRAVENOUS at 06:28

## 2024-06-29 RX ADMIN — IBUPROFEN 600 MG: 600 TABLET, FILM COATED ORAL at 18:46

## 2024-06-29 RX ADMIN — ACETAMINOPHEN 975 MG: 325 TABLET ORAL at 00:34

## 2024-06-29 RX ADMIN — ACETAMINOPHEN 975 MG: 325 TABLET ORAL at 18:45

## 2024-06-29 RX ADMIN — IBUPROFEN 600 MG: 600 TABLET, FILM COATED ORAL at 12:18

## 2024-06-29 ASSESSMENT — PAIN SCALES - GENERAL
PAINLEVEL_OUTOF10: 0 - NO PAIN
PAINLEVEL_OUTOF10: 2
PAINLEVEL_OUTOF10: 3
PAINLEVEL_OUTOF10: 3
PAIN_LEVEL: 0
PAINLEVEL_OUTOF10: 3
PAINLEVEL_OUTOF10: 1
PAINLEVEL_OUTOF10: 1

## 2024-06-29 ASSESSMENT — PAIN DESCRIPTION - DESCRIPTORS
DESCRIPTORS: ACHING
DESCRIPTORS: ACHING;SORE

## 2024-06-29 NOTE — ANESTHESIA POSTPROCEDURE EVALUATION
Patient: Mreyl Mathias    Procedure Summary       Date: 06/27/24 Room / Location:     Anesthesia Start: 1637 Anesthesia Stop: 06/28/24 0318    Procedure: Labor Analgesia Diagnosis:     Scheduled Providers:  Responsible Provider: Demetrio Pickard DO    Anesthesia Type: epidural ASA Status: 2            Anesthesia Type: No value filed.    Vitals Value Taken Time   /65 06/29/24 1436   Temp 36.8 06/29/24 1436   Pulse 78 06/29/24 1436   Resp 16 06/29/24 1436   SpO2 65 06/29/24 1436       Anesthesia Post Evaluation    Patient location during evaluation: floor  Patient participation: complete - patient participated  Level of consciousness: awake and alert  Pain score: 0  Pain management: adequate  Multimodal analgesia pain management approach  Airway patency: patent  Two or more strategies used to mitigate risk of obstructive sleep apnea  Cardiovascular status: stable and acceptable  Respiratory status: acceptable and room air  Hydration status: euvolemic  Postoperative Nausea and Vomiting: none  Comments: Neuraxial site without redness, drainage, swelling.  Neuromuscular block resolved.          No notable events documented.

## 2024-06-29 NOTE — PROGRESS NOTES
Postpartum Progress Note    Assessment/Plan   Meryl Mathias is a 29 y.o., , s/p VAVD on , who delivered at 39w5d gestation    Now PPD#1 s/p Vaginal, Vacuum (Extractor)  on 2024   - Continue routine postpartum care  - Pain well controlled on po medications  - DVT risk score DVT Score: 3 , ppx with SCDs  - Rh positive, no indication for Rhogam  - EBL 1L, s/p methergine, TXA, cytotec, s/p vag pack    - Hgb:   Results from last 7 days   Lab Units 24  0833 24  0934 24  0946   HEMOGLOBIN g/dL 10.4* 12.3 11.7*     PP endometritis   - s/p gent/clinda, amp (GBS+)  - afebrile x 24 hrs, feeling well  - WBCs 20.9 on , repeat CBC, coags, fibrinogen this am    Maternal Well-Being  - Vitals stable  - BP 93/56 this am, MAP 68, pt asymptomatic  - All questions and concerns address     Feeding  - Breastfeeding/pumping encouraged  - Lactation consult prn    Contraception  - Defers contraception to primary OB/PP visit. We discussed pregnancy spacing of at least one year, abstaining from intercourse for 6wks, and the ability to become pregnant in the absence of regular menses. Pt verbalized understanding.  - Encouraged to continue PNV    Dispo  - Anticipate d/c on PPD #2 if meeting all postpartum milestones  - Follow up in 4-6 wk for postpartum visit with your OB provider.     Naye Ching, APRN-CNP     Principal Problem:    Labor and delivery, indication for care ([facility])    Pregnancy Problems (from 24 to present)       Problem Noted Resolved    Labor and delivery, indication for care ([facility]) 2024 by Noemy Infante PA-C No    Priority:  Medium            Hospital course: endometritis  Vaginal Birth  Patient is currently breastfeedingThe patient's blood type is O POS. The baby's blood type is O POS . Rhogam is not indicated.    Subjective     Used "[employer] via [employer]k to communicate with pt and her partner. She speaks only Maldivian.    Meryl Mathias is PPD#1 s/p vaginal delivery who  reports feeling overall well.  No acute events overnight.  Voiding spontaneously, passing flatus.  Pain well controlled on PO meds.  Light lochia. Tolerating diet.  Denies HA, N/V, RUQ pain, vision changes, chest pain, or SOB. and Denies dizziness/lightheadedness/LOC/uncontrolled bleeding.    Objective   Allergies:   Patient has no known allergies.         Last Vitals:  Temp Pulse Resp BP MAP Pulse Ox   36.6 °C (97.9 °F) 73 16 93/56   98 %     Vitals Min/Max Last 24 Hours:  Temp  Min: 36.1 °C (97 °F)  Max: 37.1 °C (98.8 °F)  Pulse  Min: 73  Max: 84  Resp  Min: 16  Max: 18  BP  Min: 93/56  Max: 111/67    Intake/Output:     Intake/Output Summary (Last 24 hours) at 6/29/2024 1059  Last data filed at 6/28/2024 1600  Gross per 24 hour   Intake --   Output 767 ml   Net -767 ml       Physical Exam:  General: examination reveals a well developed, well nourished, female, in no acute distress. She is alert and cooperative.  HEENT: external ears normal. Nose normal, no erythema or discharge.  Neck: supple, no significant adenopathy  Lungs: breathing even and unlabored, lungs clear all fields  Cardiac: warm and well perfused, heart rate regular rate and rhythm, no murmur  Fundus: firm and below umbilicus, lochia light  Abdominal: soft, non tender, non-distended, bowel sounds active, + flatus  Extremities: no redness or tenderness in the calves or thighs, +1 pitting edema BLE.  Neurological: alert, oriented, normal speech, no focal findings or movement disorder noted.  Psychological: awake and alert; oriented to person, place, and time.  Skin: perineum well approximated, negative erythema/discharge/edema    Lab Data:  Lab Results   Component Value Date    ABO O 06/28/2024    LABRH POS 06/28/2024    ABSCRN NEG 06/28/2024     Lab Results   Component Value Date    WBC 20.9 (H) 06/28/2024    HGB 10.4 (L) 06/28/2024    HCT 32.7 (L) 06/28/2024     06/28/2024     0   Lab Value Date/Time    GRPBSTREP (A) 06/03/2024 1228      Isolated: Streptococcus agalactiae (Group B Streptococcus)

## 2024-06-29 NOTE — LACTATION NOTE
Lactation Consultant Note    Recommendations/Summary  This mom is [place] speaking. I attempted to use the [employer] to speak with her but it would not connect me to an interpretor, so we were able to successfully communicate via [employer]. Mom was feeding baby a bottle of formula while I entered the room. She has been both breast and bottle feeding. Mom states that she introduced formula because she was concerned that baby was not getting enough at the breast. Per her RN, mom is expressible and baby was able to latch deeply and feed effectively for about 15-20 mins. I explained to mom what an effective feeding should look like and feel like and told her that formula supplementation is not medically necessary for a baby who is breastfeeding well. However, mom still wishes to supplement with formula. I encouraged her to latch baby to the breast first in skin to skin every 2-3 hours and then to follow up second with up to 20 mls of formula. Mom expressed understanding. Mom asked about which formulas she should buy at home and I showed her ready to feed formula options on her phone so that she understands which formulas would be appropriate to purchase for her . Mom has a pump for home. I provided her with the outpatient lactation information. Mom has no further questions at this time.    Subjective   Patient ID: Jennifer is a 2 year old female who is accompanied by:mother     Well Child 2 Year    2  Yrs 0  Mos female here for [facility].  Concerns/Symptoms: none  Milk: yes  Eats fruits & veggies  Urine and stool: normal  Sleeping: well, 1 nap        Additional concerns today: None     Review of Systems   Constitutional: Negative.    HENT: Negative.    Eyes: Negative.    Respiratory: Negative.    Cardiovascular: Negative.    Gastrointestinal: Negative.    Endocrine: Negative.    Genitourinary: Negative.    Musculoskeletal: Negative.    Skin: Negative.    Allergic/Immunologic: Negative.    Neurological: Negative.    Hematological: Negative.    Psychiatric/Behavioral: Negative.    All other systems reviewed and are negative.      Patient's medications, allergies, past medical, surgical, social and family histories were reviewed and updated as appropriate.    Objective   Vitals: BP 90/53   Pulse 98   Temp 98.3 °F (36.8 °C) (Temporal)   Ht 3' 5.5\" (1.054 m)   Wt 16.4 kg (36 lb 2.5 oz)   BMI 14.76 kg/m²   BSA 0.69 m²   Growth parameters are noted and are appropriate for age.    Physical Exam    Assessment   Problem List Items Addressed This Visit     None      Visit Diagnoses     Encounter for routine child health examination without abnormal findings    -  Primary    Need for vaccination        Relevant Orders    DTAP IPV VACC 4 THRU 6 YRS (Completed)    INFLUENZA QUADRIVALENT SPLIT PRES FREE 0.5 ML VACC, IM (FLULAVAL,FLUARIX,FLUZONE) (Completed)    MMRV, MEASLES MUMPS RUBELLA VARICELLA VACC (PROQUAD) (Completed)    Screening for lead exposure        Relevant Orders    POCT BLOOD LEAD (Completed)    Screening for iron deficiency anemia        Relevant Orders    POCT HEMOGLOBIN (Completed)          Screening tests  Developmental milestones were reviewed and were: normal based on age    Counseling  Nutrition/Weight Management:  Assessment and discussion of current Nutrition behaviors performed:   Yes  Assessment and discussion of current Physical Activity behaviors performed: Yes    Immunizations: per orders.  History of previous adverse reactions to immunizations? no  Immunizations given today: Yes - Immunizations given today and vaccine counseling including benefits, risks, and adverse reactions were provided by myself during the visit.     Follow-up yearly for a well visit, or sooner as needed.

## 2024-06-30 VITALS
BODY MASS INDEX: 30 KG/M2 | SYSTOLIC BLOOD PRESSURE: 112 MMHG | HEART RATE: 85 BPM | OXYGEN SATURATION: 96 % | DIASTOLIC BLOOD PRESSURE: 70 MMHG | RESPIRATION RATE: 16 BRPM | WEIGHT: 163 LBS | HEIGHT: 62 IN | TEMPERATURE: 97.5 F

## 2024-06-30 PROBLEM — Z37.9 VACUUM-ASSISTED VAGINAL DELIVERY (HHS-HCC): Status: ACTIVE | Noted: 2024-06-30

## 2024-06-30 PROCEDURE — 2500000001 HC RX 250 WO HCPCS SELF ADMINISTERED DRUGS (ALT 637 FOR MEDICARE OP): Performed by: STUDENT IN AN ORGANIZED HEALTH CARE EDUCATION/TRAINING PROGRAM

## 2024-06-30 RX ORDER — IBUPROFEN 600 MG/1
600 TABLET ORAL EVERY 6 HOURS PRN
Qty: 60 TABLET | Refills: 0 | Status: SHIPPED | OUTPATIENT
Start: 2024-06-30

## 2024-06-30 RX ORDER — ACETAMINOPHEN 325 MG/1
975 TABLET ORAL EVERY 6 HOURS PRN
Qty: 120 TABLET | Refills: 0 | Status: SHIPPED | OUTPATIENT
Start: 2024-06-30

## 2024-06-30 RX ORDER — FERROUS SULFATE 325(65) MG
325 TABLET ORAL
Qty: 30 TABLET | Refills: 0 | Status: SHIPPED | OUTPATIENT
Start: 2024-06-30

## 2024-06-30 RX ORDER — POLYETHYLENE GLYCOL 3350 17 G/17G
17 POWDER, FOR SOLUTION ORAL 2 TIMES DAILY PRN
Qty: 14 PACKET | Refills: 0 | Status: SHIPPED | OUTPATIENT
Start: 2024-06-30

## 2024-06-30 RX ADMIN — ACETAMINOPHEN 975 MG: 325 TABLET ORAL at 12:57

## 2024-06-30 RX ADMIN — ACETAMINOPHEN 975 MG: 325 TABLET ORAL at 00:41

## 2024-06-30 RX ADMIN — IBUPROFEN 600 MG: 600 TABLET, FILM COATED ORAL at 00:41

## 2024-06-30 RX ADMIN — IBUPROFEN 600 MG: 600 TABLET, FILM COATED ORAL at 12:57

## 2024-06-30 RX ADMIN — ACETAMINOPHEN 975 MG: 325 TABLET ORAL at 06:29

## 2024-06-30 RX ADMIN — IBUPROFEN 600 MG: 600 TABLET, FILM COATED ORAL at 06:29

## 2024-06-30 ASSESSMENT — PAIN SCALES - GENERAL
PAINLEVEL_OUTOF10: 0 - NO PAIN

## 2024-06-30 NOTE — DISCHARGE SUMMARY
"Discharge Summary    Meryl Mathias is a 29 y.o. year old female   Admission Date: 6/27/2024  Discharge Date: 06/30/24       Discharge Diagnosis  Vacuum assisted vaginal delivery    Hospital Course  Delivery Date: 6/28/2024  3:18 AM   Delivery type: Vaginal, Vacuum (Extractor)  c/b 2nd degree laceration s/p repair  GA at delivery: 39w5d   Outcome: Living   Intrapartum complications: None   Feeding method: Breastfeeding Status: Yes     Procedures: none  Contraception at discharge: Declines  Defers contraception to primary OB/PP visit. We discussed pregnancy spacing of at least one year, abstaining from intercourse for 6wks, and the ability to become pregnant in the absence of regular menses. Pt verbalized understanding.    PP endometritis   - s/p gent/clinda, amp (GBS+)  - afebrile, VS WNL, feeling well  - WBCs 20.9 on 6/28--> 17.3 PPD2, downtrending    PPH  - 1 L blood loss with pushing, s/p methergine, TXA, rectal cytotec, vag pack and jack  - hgb 10.4--> 8.9  - Appropriate for home PO iron supplementation     Pertinent Physical Exam At Time of Discharge    General: Examination reveals a well developed, well nourished, female, in no acute distress. She is alert and cooperative.  Lungs: symmetrical, non-labored breathing.  Cardiac: warm, well-perfused.  Abdomen: soft, non-tender.  Fundus: firm, below umbilicus, and nontender.  Extremities: no redness or tenderness in the calves or thighs.  Neurological: alert, oriented, normal speech, no focal findings or movement disorder noted.     Vitals  /70 (BP Location: Left arm, Patient Position: Lying)   Pulse 85   Temp 36.4 °C (97.5 °F) (Temporal)   Resp 16   Ht 1.575 m (5' 2\")   Wt 73.9 kg (163 lb)   SpO2 96%   Breastfeeding Yes   BMI 29.81 kg/m²      Discharge Meds     Your medication list        START taking these medications        Instructions Last Dose Given Next Dose Due   acetaminophen 325 mg tablet  Commonly known as: Tylenol      Take 3 tablets (975 mg) " by mouth every 6 hours if needed for mild pain (1 - 3) or moderate pain (4 - 6).       ibuprofen 600 mg tablet      Take 1 tablet (600 mg) by mouth every 6 hours if needed for mild pain (1 - 3) or moderate pain (4 - 6).       polyethylene glycol 17 gram packet  Commonly known as: Glycolax, Miralax      Take 17 g by mouth 2 times a day as needed (constipation).              CONTINUE taking these medications        Instructions Last Dose Given Next Dose Due   ferrous sulfate (325 mg ferrous sulfate) tablet      Take 1 tablet by mouth once daily with breakfast.       prenatal vitamin (iron-folic) 27 mg iron-800 mcg folic acid tablet      U?ng 1 viên m?i ngày m?t l?n.  (Take 1 tablet by mouth once daily.)                 Where to Get Your Medications        These medications were sent to [facility]/pharmacy #[id number] - [place], [place] - [address] AT [place] [address], [place] [address]      Phone: [phone]   acetaminophen 325 mg tablet  ferrous sulfate (325 mg ferrous sulfate) tablet  ibuprofen 600 mg tablet  polyethylene glycol 17 gram packet          Complications Requiring Follow-Up  None    Test Results Pending At Discharge  Pending Labs       Order Current Status    Surgical Pathology Exam - [facility] In process            Outpatient Follow-Up  Follow up with your primary OB in 4-6 weeks for postpartum visit    I spent 20 minutes in the professional and overall care of this patient.      Diane Gunter, APRN-CNP

## 2024-06-30 NOTE — NURSING NOTE
Late entry for 1400: Discharge instructions, POST BIRTH warning signs, and AVS reviewed with patient and spouse with RYNE Moscoso interpereter # [id number]. Patient and spouse verbalize understanding of all instruction. Questions answered. Patient and spouse deny other questions

## 2024-06-30 NOTE — CARE PLAN
The patient's goals for the shift include      The clinical goals for the shift include Maintain lochia as moderate to scant through 1900 on 6/30/2024.    VSS and assessments WNL. Mother and infant bonding well. Pt appropriate for discharge.

## 2024-06-30 NOTE — LACTATION NOTE
Lactation Consultant Note  Lactation Consultation       Maternal Information       Maternal Assessment       Infant Assessment       Feeding Assessment       LATCH TOOL       Breast Pump       Other OB Lactation Tools       Patient Follow-up       Other OB Lactation Documentation       Recommendations/Summary  In to follow up with mom regarding breast feeding as mom fed infant formula through the night. Mom stated that she plans to do both formula and breast feeding, but did not want further assistance with latching infant and did not have any further questions. Encouraged mom to call if further help or guidance is needed.

## 2024-07-02 ENCOUNTER — APPOINTMENT (OUTPATIENT)
Dept: OBSTETRICS AND GYNECOLOGY | Facility: CLINIC | Age: 29
End: 2024-07-02
Payer: COMMERCIAL

## 2024-07-03 LAB
LABORATORY COMMENT REPORT: NORMAL
PATH REPORT.FINAL DX SPEC: NORMAL
PATH REPORT.GROSS SPEC: NORMAL
PATH REPORT.RELEVANT HX SPEC: NORMAL
PATH REPORT.TOTAL CANCER: NORMAL

## 2024-07-16 NOTE — DOCUMENTATION CLARIFICATION NOTE
"    PATIENT:               ROC JUDD  ACCT #:                  [id number]  MRN:                       [id number]  :                       1995  ADMIT DATE:       2024 8:10 AM  DISCH DATE:        2024 2:15 PM  RESPONDING PROVIDER #:        [id number]          PROVIDER RESPONSE TEXT:    Pt had prelabor rupture of membranes (PROM), but also spontaneous rupture of membranes. It was not premature. The P in PROM stands for prelabor, not premature.    CDI QUERY TEXT:    Clarification    Instruction:    Based on your assessment of the patient and the clinical information, please provide the requested documentation by clicking on the appropriate radio button and enter any additional information if prompted.    Question: Based on your medical judgment, can you please clarify which of these responses is the most clinically supported    When answering this query, please exercise your independent professional judgment. The fact that a question is being asked, does not imply that any particular answer is desired or expected.    The patient's clinical indicators include:  Clinical Information:  29 y.o., , s/p VAVD on , who delivered at 39w5d gestation    There is a conflicting documentation in the medical record regarding the rupture of membranes which requires clarification.    -The diagnosis of SROM was documented on  admission history and physical.  \" Admit to L&D for SROM  Roc Gonzalez presents to Labor & Delivery with Spontaneous Rupture of Membranes of clear fluid at 0700 hr on \"    -The diagnosis of PROM was documented on  progress note and  delivery note.  \"Pt presented for PROM at 39w4d, subsequently underwent IOL -\"      Clinical Indicators:  significant event note states \" In setting of slight cervical change, discussed option for cervical recheck in 4 hours to evaluate for spontaneous labor, versus beginning oxytocin augmentation now. Pt opts for oxytocin augmentation " "now.\"    6/27 progress note:  \"PROM, IOL  Now active labor  - Continue pit per protocol  - Epidural in place\"      Treatment: pitocin, VAVD    Risk Factors: presented with ROM at 0700 on 6/27  Options provided:  -- Patient had spontaneous rupture of membranes  -- Patient had premature rupture of membranes  -- Other - I will add my own diagnosis  -- Refer to Clinical Documentation Reviewer    Query created by: Oliva King on 7/10/2024 10:13 AM      Electronically signed by:  KRYSTINA MULLINS 7/16/2024 10:28 AM          "

## 2024-07-22 ENCOUNTER — OFFICE VISIT (OUTPATIENT)
Dept: OBSTETRICS AND GYNECOLOGY | Facility: CLINIC | Age: 29
End: 2024-07-22
Payer: COMMERCIAL

## 2024-07-22 VITALS — DIASTOLIC BLOOD PRESSURE: 75 MMHG | WEIGHT: 134 LBS | SYSTOLIC BLOOD PRESSURE: 122 MMHG | BODY MASS INDEX: 24.51 KG/M2

## 2024-07-22 PROCEDURE — 99213 OFFICE O/P EST LOW 20 MIN: CPT | Performed by: OBSTETRICS & GYNECOLOGY

## 2024-07-22 PROCEDURE — 1036F TOBACCO NON-USER: CPT | Performed by: OBSTETRICS & GYNECOLOGY

## 2024-07-22 RX ORDER — IBUPROFEN 800 MG/1
800 TABLET ORAL EVERY 8 HOURS PRN
Qty: 40 TABLET | Refills: 1 | Status: SHIPPED | OUTPATIENT
Start: 2024-07-22 | End: 2024-08-21

## 2024-07-22 RX ORDER — DOCUSATE SODIUM 100 MG/1
100 CAPSULE, LIQUID FILLED ORAL 2 TIMES DAILY PRN
Qty: 60 CAPSULE | Refills: 2 | Status: SHIPPED | OUTPATIENT
Start: 2024-07-22

## 2024-07-22 RX ORDER — SULFAMETHOXAZOLE AND TRIMETHOPRIM 800; 160 MG/1; MG/1
1 TABLET ORAL 2 TIMES DAILY
Qty: 14 TABLET | Refills: 0 | Status: SHIPPED | OUTPATIENT
Start: 2024-07-22 | End: 2024-07-29

## 2024-07-22 ASSESSMENT — ENCOUNTER SYMPTOMS
UNEXPECTED WEIGHT CHANGE: 0
CHILLS: 0
BACK PAIN: 0
VOMITING: 0
NAUSEA: 0
FEVER: 0
HEMATURIA: 0
FREQUENCY: 0
ABDOMINAL DISTENTION: 0
ABDOMINAL PAIN: 0
COLOR CHANGE: 0
BLOOD IN STOOL: 0
APPETITE CHANGE: 0
SHORTNESS OF BREATH: 0
FATIGUE: 0
DIARRHEA: 0
SLEEP DISTURBANCE: 0
CONSTIPATION: 0
DYSURIA: 0
FLANK PAIN: 0

## 2024-07-22 ASSESSMENT — PAIN SCALES - GENERAL: PAINLEVEL: 3

## 2024-07-22 NOTE — PROGRESS NOTES
Post Partum Visit    29 y.o.  presenting for postpartum follow-up     Delivery Date: 24    Type of Delivery: Vaginal, Vacuum (Extractor)     Concerns: vaginal pain  Pt reports vaginal pain and difficulty with walking and sitting for the last 2 weeks.  Describes pain as tingling. Taking tylenol only.   Denies vaginal bleeding or discharge. Denies fever, chills.   Also having some constipation.      Lacerations: 2nd degree, sulcal  Lochia: none  Not yet sexually active.   Baby doing well. Feeding formula.     Mood is overall good.       Vitals:    24 1154   BP: 122/75       Review of Systems   Constitutional:  Negative for appetite change, chills, fatigue, fever and unexpected weight change.   Respiratory:  Negative for shortness of breath.    Cardiovascular:  Negative for chest pain.   Gastrointestinal:  Negative for abdominal distention, abdominal pain, blood in stool, constipation, diarrhea, nausea and vomiting.   Endocrine: Negative for cold intolerance and heat intolerance.   Genitourinary:  Positive for vaginal pain. Negative for dyspareunia, dysuria, flank pain, frequency, genital sores, hematuria, menstrual problem, pelvic pain, urgency, vaginal bleeding and vaginal discharge.   Musculoskeletal:  Negative for back pain.   Skin:  Negative for color change.   Psychiatric/Behavioral:  Negative for sleep disturbance.      Physical Exam  Constitutional:       Appearance: Normal appearance.   Abdominal:      General: Abdomen is flat.      Palpations: Abdomen is soft.   Genitourinary:         Comments: Sutures noted at right vaginal opening   Purulent discharge noted  Significantly tender to palpation  Single finger exam of vagina with intact suture lines  Unable to perform speculum exam due to patient discomfort   Skin:     General: Skin is warm and dry.   Neurological:      Mental Status: She is alert.   Psychiatric:         Mood and Affect: Mood normal.         Behavior: Behavior normal.            Assessment and Plan:    Postpartum care  - postpartum precautions reviewed, including PP depression  - feeding:  formula feeding    Vaginal laceration  Significantly tender to palpation with some purulent discharge.   Start abx BID x 7 days.   Ibuprofen in addition to tylenol to use prn pain.   Sitz bath 1-2 times daily (warm water soak for perineal/vulvar region)  Colace 1-2 times daily to help with constipation.    Follow up 7-10 days

## 2024-07-29 ENCOUNTER — APPOINTMENT (OUTPATIENT)
Dept: OBSTETRICS AND GYNECOLOGY | Facility: CLINIC | Age: 29
End: 2024-07-29
Payer: COMMERCIAL

## 2024-07-29 VITALS
WEIGHT: 137 LBS | SYSTOLIC BLOOD PRESSURE: 102 MMHG | HEIGHT: 60 IN | DIASTOLIC BLOOD PRESSURE: 62 MMHG | BODY MASS INDEX: 26.9 KG/M2

## 2024-07-29 DIAGNOSIS — G56.02 CARPAL TUNNEL SYNDROME OF LEFT WRIST: Primary | ICD-10-CM

## 2024-07-29 PROCEDURE — 99213 OFFICE O/P EST LOW 20 MIN: CPT | Performed by: OBSTETRICS & GYNECOLOGY

## 2024-07-29 ASSESSMENT — ENCOUNTER SYMPTOMS
HEMATURIA: 0
NAUSEA: 0
BLOOD IN STOOL: 0
DYSURIA: 0
CONSTIPATION: 0
COLOR CHANGE: 0
ABDOMINAL PAIN: 0
FATIGUE: 0
ABDOMINAL DISTENTION: 0
FLANK PAIN: 0
CHILLS: 0
FEVER: 0
FREQUENCY: 0
SLEEP DISTURBANCE: 0
VOMITING: 0
UNEXPECTED WEIGHT CHANGE: 0
BACK PAIN: 0
SHORTNESS OF BREATH: 0
APPETITE CHANGE: 0
DIARRHEA: 0

## 2024-07-29 ASSESSMENT — EDINBURGH POSTNATAL DEPRESSION SCALE (EPDS)
I HAVE BEEN ABLE TO LAUGH AND SEE THE FUNNY SIDE OF THINGS: AS MUCH AS I ALWAYS COULD
I HAVE FELT SAD OR MISERABLE: NO, NOT AT ALL
I HAVE BEEN SO UNHAPPY THAT I HAVE BEEN CRYING: NO, NEVER
I HAVE BEEN SO UNHAPPY THAT I HAVE HAD DIFFICULTY SLEEPING: NOT AT ALL
THINGS HAVE BEEN GETTING ON TOP OF ME: NO, I HAVE BEEN COPING AS WELL AS EVER
THE THOUGHT OF HARMING MYSELF HAS OCCURRED TO ME: NEVER
TOTAL SCORE: 0
I HAVE BEEN ANXIOUS OR WORRIED FOR NO GOOD REASON: NO, NOT AT ALL
I HAVE LOOKED FORWARD WITH ENJOYMENT TO THINGS: AS MUCH AS I EVER DID
I HAVE BLAMED MYSELF UNNECESSARILY WHEN THINGS WENT WRONG: NO, NEVER
I HAVE FELT SCARED OR PANICKY FOR NO GOOD REASON: NO, NOT AT ALL

## 2024-07-29 ASSESSMENT — PAIN SCALES - GENERAL: PAINLEVEL: 0-NO PAIN

## 2024-07-29 NOTE — PROGRESS NOTES
Meryl Mathias is a 29 y.o.  here for follow up on perineal laceration infection following /2nd degree lac repair.  Pixta  used throughout visit. (Tajik)     Pt started on abx 1 week ago. Reports she is feeling much better. Denies irregular bleeding. Only slight vaginal discharge. Denies urinary problems.  Easier to walk around.     Pt c/o continued numbness and weakness in left hand. Tingling in fingers. Started during pregnancy and has not gotten better since delivery.        Past medical and surgical history reviewed.     Obstetric History  OB History    Para Term  AB Living   1 1 1     1   SAB IAB Ectopic Multiple Live Births         0 1      # Outcome Date GA Lbr Gunnar/2nd Weight Sex Type Anes PTL Lv   1 Term 24 39w5d 19:20 / 00:58 3.41 kg F Vag-Vacuum EPI  UVALDO        Past Medical History  She has no past medical history on file.    Surgical History  She has no past surgical history on file.     Social History  She reports that she has never smoked. She has never been exposed to tobacco smoke. She has never used smokeless tobacco. She reports that she does not drink alcohol and does not use drugs.    Family History  No family history on file.      /62 (BP Location: Left arm, Patient Position: Sitting)   Ht 1.524 m (5')   Wt 62.1 kg (137 lb)   LMP  (LMP Unknown)   Breastfeeding No   BMI 26.76 kg/m²   No LMP recorded (lmp unknown).    Review of Systems   Constitutional:  Negative for appetite change, chills, fatigue, fever and unexpected weight change.   Respiratory:  Negative for shortness of breath.    Cardiovascular:  Negative for chest pain.   Gastrointestinal:  Negative for abdominal distention, abdominal pain, blood in stool, constipation, diarrhea, nausea and vomiting.   Endocrine: Negative for cold intolerance and heat intolerance.   Genitourinary:  Negative for dyspareunia, dysuria, flank pain, frequency, genital sores, hematuria, menstrual problem,  pelvic pain, urgency, vaginal bleeding, vaginal discharge and vaginal pain.   Musculoskeletal:  Negative for back pain.        Tingling in fingers of left hand, left hand weakness   Skin:  Negative for color change.   Psychiatric/Behavioral:  Negative for sleep disturbance.        Physical Exam  Constitutional:       Appearance: Normal appearance.   Abdominal:      General: Abdomen is flat.      Palpations: Abdomen is soft.      Tenderness: There is no abdominal tenderness.   Genitourinary:     General: Normal vulva.      Vagina: Normal.      Cervix: Normal.      Uterus: Normal.       Comments: Stitches still palpable at site of laceration repair. No erythema or purulent drainage.     Skin:     General: Skin is warm and dry.   Neurological:      Mental Status: She is alert.   Psychiatric:         Mood and Affect: Mood normal.         Behavior: Behavior normal.           Assessment and Plan:    Laceration repair  Seems to be healing well. No evidence of infection today.  Advised pt pelvic rest until 6 weeks/until final pp follow up.  Should continue to improve day by day.

## 2024-08-15 ENCOUNTER — APPOINTMENT (OUTPATIENT)
Dept: OBSTETRICS AND GYNECOLOGY | Facility: CLINIC | Age: 29
End: 2024-08-15
Payer: COMMERCIAL

## 2024-08-15 VITALS
BODY MASS INDEX: 23.74 KG/M2 | WEIGHT: 134 LBS | DIASTOLIC BLOOD PRESSURE: 74 MMHG | SYSTOLIC BLOOD PRESSURE: 109 MMHG | HEIGHT: 63 IN

## 2024-08-15 DIAGNOSIS — Z00.00 HEALTH CARE MAINTENANCE: ICD-10-CM

## 2024-08-15 DIAGNOSIS — Z30.011 ENCOUNTER FOR INITIAL PRESCRIPTION OF CONTRACEPTIVE PILLS: ICD-10-CM

## 2024-08-15 DIAGNOSIS — R21 RASH: Primary | ICD-10-CM

## 2024-08-15 RX ORDER — DESOGESTREL AND ETHINYL ESTRADIOL 0.15-0.03
1 KIT ORAL DAILY
Qty: 84 TABLET | Refills: 3 | Status: SHIPPED | OUTPATIENT
Start: 2024-08-15 | End: 2025-08-15

## 2024-08-15 RX ORDER — HYDROCORTISONE 25 MG/G
CREAM TOPICAL 2 TIMES DAILY
Qty: 28 G | Refills: 3 | Status: SHIPPED | OUTPATIENT
Start: 2024-08-15

## 2024-08-15 RX ORDER — CETIRIZINE HYDROCHLORIDE 10 MG/1
10 TABLET ORAL DAILY
Qty: 90 TABLET | Refills: 3 | Status: SHIPPED | OUTPATIENT
Start: 2024-08-15 | End: 2024-08-16 | Stop reason: SDUPTHER

## 2024-08-15 ASSESSMENT — PAIN SCALES - GENERAL: PAINLEVEL: 0-NO PAIN

## 2024-08-15 ASSESSMENT — ENCOUNTER SYMPTOMS
CONSTIPATION: 0
VOMITING: 0
BLOOD IN STOOL: 0
FEVER: 0
SHORTNESS OF BREATH: 0
FLANK PAIN: 0
FATIGUE: 0
BACK PAIN: 0
ABDOMINAL PAIN: 0
SLEEP DISTURBANCE: 0
DYSURIA: 0
DIARRHEA: 0
APPETITE CHANGE: 0
FREQUENCY: 0
NAUSEA: 0
UNEXPECTED WEIGHT CHANGE: 0
HEMATURIA: 0
ABDOMINAL DISTENTION: 0
CHILLS: 0
COLOR CHANGE: 0

## 2024-08-15 ASSESSMENT — EDINBURGH POSTNATAL DEPRESSION SCALE (EPDS)
I HAVE BEEN ABLE TO LAUGH AND SEE THE FUNNY SIDE OF THINGS: AS MUCH AS I ALWAYS COULD
I HAVE BEEN SO UNHAPPY THAT I HAVE BEEN CRYING: NO, NEVER
I HAVE FELT SCARED OR PANICKY FOR NO GOOD REASON: NO, NOT AT ALL
I HAVE BEEN SO UNHAPPY THAT I HAVE HAD DIFFICULTY SLEEPING: NOT AT ALL
THE THOUGHT OF HARMING MYSELF HAS OCCURRED TO ME: NEVER
THINGS HAVE BEEN GETTING ON TOP OF ME: NO, I HAVE BEEN COPING AS WELL AS EVER
I HAVE FELT SAD OR MISERABLE: NO, NOT AT ALL
I HAVE BLAMED MYSELF UNNECESSARILY WHEN THINGS WENT WRONG: NO, NEVER
I HAVE BEEN ANXIOUS OR WORRIED FOR NO GOOD REASON: NO, NOT AT ALL
TOTAL SCORE: 0
I HAVE LOOKED FORWARD WITH ENJOYMENT TO THINGS: AS MUCH AS I EVER DID

## 2024-08-15 NOTE — PROGRESS NOTES
Post Partum Visit    29 y.o.  presenting for postpartum follow-up   Pt speaks Pashto and the [employer]  services were used.     Delivery Date: 24    Type of Delivery: Vaginal, Vacuum (Extractor)     Concerns: itchy rash, vaginal lump  Pt had complained about all over body itching which started at the last week of her pregnancy.  ICP testing was negative at the time. No rash present at the time. Since delivery patient complains of very itchy rash all over body with large hives on trunk, arms, legs.  She has tried OTC cortisone cream but it is still very itchy and hard to sleep. She has an appointment later this month with Ortho for her hand weakness. She complains of vaginal lump or pimple that has been present since delivery.    Pain: controlled  Lacerations: 2nd degree  Lochia: resolved  Sexual Intimacy: No  Contraceptive Method: CHC  Feeding Method: She is bottle feeding.     Bonding with Baby: well with baby girl, Jessica  Mood:   Postpartum Depression: Low Risk  (8/15/2024)    Willard  Depression Scale     Last EPDS Total Score: 0     Last EPDS Self Harm Result: Never       Vitals:    08/15/24 0830   BP: 109/74       Review of Systems   Constitutional:  Negative for appetite change, chills, fatigue, fever and unexpected weight change.   Respiratory:  Negative for shortness of breath.    Cardiovascular:  Negative for chest pain.   Gastrointestinal:  Negative for abdominal distention, abdominal pain, blood in stool, constipation, diarrhea, nausea and vomiting.   Endocrine: Negative for cold intolerance and heat intolerance.   Genitourinary:  Negative for dyspareunia, dysuria, flank pain, frequency, genital sores, hematuria, menstrual problem, pelvic pain, urgency, vaginal bleeding, vaginal discharge and vaginal pain.   Musculoskeletal:  Negative for back pain.   Skin:  Positive for rash (very itchy). Negative for color change.   Psychiatric/Behavioral:  Negative for sleep  disturbance.        Physical Exam  Constitutional:       Appearance: Normal appearance.   Genitourinary:     General: Normal vulva.      Vagina: Normal.      Cervix: Normal.      Uterus: Normal.       Adnexa: Right adnexa normal and left adnexa normal.          Comments: Small skin tag at vaginal opening as a result of vaginal laceration repair, nontender, no erythema   Skin:     General: Skin is warm and dry.      Findings: Rash (large urticaria all over body) present.   Neurological:      General: No focal deficit present.      Mental Status: She is alert.   Psychiatric:         Mood and Affect: Mood normal.         Behavior: Behavior normal.             Assessment and Plan:    Postpartum care  - postpartum precautions reviewed, including PP depression  - feeding:  formula feeding  - contraception discussed including importance of birth spacing; contraception plan: NORM - pt has not used birth control of any kind in the past.  We discussed how to take, efficacy, side effects. All questions were answered. Pt expressed understanding.     Vaginal skin tag  Reassurance provided regarding vaginal exam.  Pt has a small vaginal skin tag at vaginal opening (12:00) that is a result of the vaginal laceration repair. It is nontender and does not cause the patient any problems.  We reviewed it will likely become less noticeable over time but if it starts to be bothersome, can consider surgical revision.     Rash  -large urticaria all over body   -referral to dermatology  -start daily antihistamine and cortisone cream as needed for now      Hand weakness  -follow up with Ortho as needed    Follow up for routine annual exam

## 2024-08-16 ENCOUNTER — OFFICE VISIT (OUTPATIENT)
Dept: DERMATOLOGY | Facility: CLINIC | Age: 29
End: 2024-08-16
Payer: COMMERCIAL

## 2024-08-16 DIAGNOSIS — L50.9 URTICARIA: Primary | ICD-10-CM

## 2024-08-16 DIAGNOSIS — R21 RASH: ICD-10-CM

## 2024-08-16 DIAGNOSIS — L70.0 ACNE VULGARIS: ICD-10-CM

## 2024-08-16 DIAGNOSIS — L85.3 XEROSIS CUTIS: ICD-10-CM

## 2024-08-16 PROCEDURE — 99204 OFFICE O/P NEW MOD 45 MIN: CPT | Performed by: DERMATOLOGY

## 2024-08-16 RX ORDER — CLINDAMYCIN PHOSPHATE 10 UG/ML
LOTION TOPICAL 2 TIMES DAILY
Qty: 60 ML | Refills: 11 | Status: SHIPPED | OUTPATIENT
Start: 2024-08-16 | End: 2025-08-16

## 2024-08-16 RX ORDER — CETIRIZINE HYDROCHLORIDE 10 MG/1
10 TABLET ORAL DAILY
Qty: 60 TABLET | Refills: 1 | Status: SHIPPED | OUTPATIENT
Start: 2024-08-16

## 2024-08-16 ASSESSMENT — DERMATOLOGY PATIENT ASSESSMENT
ARE YOU TRYING TO GET PREGNANT: NO
DO YOU USE A TANNING BED: NO
DO YOU HAVE IRREGULAR MENSTRUAL CYCLES: NO
WHAT TYPE OF BIRTH CONTROL: ORAL
DO YOU HAVE ANY NEW OR CHANGING LESIONS: NO
ARE YOU ON BIRTH CONTROL: YES

## 2024-08-16 ASSESSMENT — DERMATOLOGY QUALITY OF LIFE (QOL) ASSESSMENT
RATE HOW EMOTIONALLY BOTHERED YOU ARE BY YOUR SKIN PROBLEM (FOR EXAMPLE, WORRY, EMBARRASSMENT, FRUSTRATION): 1
RATE HOW BOTHERED YOU ARE BY EFFECTS OF YOUR SKIN PROBLEMS ON YOUR ACTIVITIES (EG, GOING OUT, ACCOMPLISHING WHAT YOU WANT, WORK ACTIVITIES OR YOUR RELATIONSHIPS WITH OTHERS): 0 - NEVER BOTHERED
ARE THERE EXCLUSIONS OR EXCEPTIONS FOR THE QUALITY OF LIFE ASSESSMENT: NO
RATE HOW BOTHERED YOU ARE BY SYMPTOMS OF YOUR SKIN PROBLEM (EG, ITCHING, STINGING BURNING, HURTING OR SKIN IRRITATION): 2

## 2024-08-16 NOTE — PROGRESS NOTES
Subjective     Meryl Mathias is a 29 y.o. female who presents for the following: Rash.  With the assistance of her neighbor, who accompanies the patient today, and a Azeri  during the visit, the patient notes an intermittent itchy rash over the past 6 weeks.  She reports she gave birth around this time, but did not start any new medications, including any prescription or over-the-counter medications, herbals, vitamins, or other supplements.  She has tried cortisone cream and also one pill of cetirizine yesterday prescribed by her OB/GYN, which she states helped significantly.  She denies any tongue or lip swelling or any shortness of breath, wheezing, or difficulty swallowing.      Review of Systems:  No other skin or systemic complaints other than what is documented elsewhere in the note.    The following portions of the chart were reviewed this encounter and updated as appropriate:       Skin Cancer History  No skin cancer on file.    Specialty Problems    None      Past Dermatologic / Past Relevant Medical History:    No history of urticaria, eczema, or psoriasis    Family History:    No family history of urticaria, eczema, or psoriasis    Social History:    The patient had a baby approximately 6 weeks ago and speaks Azeri and is accompanied by her neighbor today    Allergies:  Patient has no known allergies.    Current Medications / CAM's:    Current Outpatient Medications:     acetaminophen (Tylenol) 325 mg tablet, Take 3 tablets (975 mg) by mouth every 6 hours if needed for mild pain (1 - 3) or moderate pain (4 - 6)., Disp: 120 tablet, Rfl: 0    cetirizine (Allergy Relief, cetirizine,) 10 mg tablet, Take 1 tablet (10 mg) by mouth once daily., Disp: 60 tablet, Rfl: 1    clindamycin (Cleocin T) 1 % lotion, Apply topically 2 times a day., Disp: 60 mL, Rfl: 11    desogestreL-ethinyl estradioL (Apri) 0.15-0.03 mg tablet, Take 1 tablet by mouth once daily., Disp: 84 tablet, Rfl: 3    docusate  "sodium (Colace) 100 mg capsule, Take 1 capsule (100 mg) by mouth 2 times a day as needed for constipation. (Patient not taking: Reported on 8/16/2024), Disp: 60 capsule, Rfl: 2    ferrous sulfate, 325 mg ferrous sulfate, tablet, Take 1 tablet by mouth once daily with breakfast., Disp: 30 tablet, Rfl: 0    hydrocortisone 2.5 % cream, Apply topically 2 times a day., Disp: 28 g, Rfl: 3    ibuprofen 800 mg tablet, Take 1 tablet (800 mg) by mouth every 8 hours if needed for mild pain (1 - 3)., Disp: 40 tablet, Rfl: 1    polyethylene glycol (Glycolax, Miralax) 17 gram packet, Take 17 g by mouth 2 times a day as needed (constipation). (Patient not taking: Reported on 8/16/2024), Disp: 14 packet, Rfl: 0    Prenatal Vitamin 27 mg iron- 0.8 mg tablet, TAKE 1 TABLET BY MOUTH EVERY DAY, Disp: 90 tablet, Rfl: 1     Objective   Well appearing patient in no apparent distress; mood and affect are within normal limits.    A waist-up examination was performed including scalp, face, eyes, ears, nose, lips, neck, chest, axillae, abdomen, back, and bilateral upper extremities. All findings within normal limits unless otherwise noted below.        Assessment/Plan   1. Urticaria  She demonstrated pictures on her phone of large pink to erythematous, urticarial-appearing plaques primarily on her trunk and legs, however, she has no active lesions on exam today.   There is evidence of dermatographism after stroking the skin on the patient's right upper back in the shape of an \"X\" with a capped pen.    Urticaria - generalized with dermatographism.  The hopefully self-resolving nature of this condition and treatment options were discussed extensively with the patient and her neighbor with the assistance of a virtual Filipino  in the office today.  At this time, for symptomatic therapy, we recommend scheduled oral anti-histamine therapy with over-the-counter Zyrtec 10 mg (or Allegra 180 mg or Claritin 10 mg) PO once daily in the " morning and over-the-counter Benadryl PO qhs PRN insomnia/pruritus as tolerated without excessive drowsiness or sedation.  The patient was instructed to begin taking Zyrtec (or Allegra or Claritin) once daily in the morning and increase to twice daily after 1 week if continuing to break through with hives.  The patient was instructed to then continue taking the chosen non-sedating oral anti-histamine once or twice daily if needed for at least an entire one month.  If the lesions have resolved by the end of the one month, the patient was instructed to then begin tapering off the anti-histamines slowly.  However, if continuing to break through with hives after 1 month, the patient was instructed to continue oral anti-histamine therapy for a total of 2 months.  However, if continuing to break through 2 months from now, the patient was instructed to schedule a consultation visit with Allergy/Immunology for further evaluation and consideration of possible laboratory work-up and/or further treatment options if indicated at that time.  The risks, benefits, and side effects of these medications, including possible drowsiness or sedation, were discussed.  The patient and her neighbor expressed understanding and are in agreement with this plan, and she states she will schedule a consultation visit with Allergy/Immunology for further evaluation if continuing to develop urticarial lesions 2 months from now.    2. Rash    Related Medications  hydrocortisone 2.5 % cream  Apply topically 2 times a day.    cetirizine (Allergy Relief, cetirizine,) 10 mg tablet  Take 1 tablet (10 mg) by mouth once daily.    3. Acne vulgaris  Head - Anterior (Face)  Scattered on the patient's face, there are a few open and closed comedones; a few erythematous, inflammatory papules; and a few pink to hyperpigmented macules consistent with post-inflammatory hyperpigmentation    Acne Vulgaris - face; mild; mainly inflammatory type.  The nature of this  condition and treatment options were discussed extensively with the patient and her neighbor today.  At this time, we recommend topical therapy with Clindamycin 1% lotion, which she was instructed to apply twice daily to the affected areas of her face or up to 3-4 times per day as needed for active lesions.  The risks, benefits, and side effects of this medication were discussed.  We also informed the patient it will likely take at least 2-3 months to notice any significant improvement with the treatment regimen outlined above.  She expressed understanding and is in agreement with this plan.    clindamycin (Cleocin T) 1 % lotion - Head - Anterior (Face)  Apply topically 2 times a day.    4. Xerosis cutis  Diffuse generalized dry, scaly skin.    Xerosis.  I emphasized the importance of dry, sensitive skin care, including the use of a mild soap, such as Dove, and frequent and aggressive moisturization, at least twice daily and immediately following showers or baths, with recommended over-the-counter moisturizing creams, such as Eucerin, Cetaphil, Cerave, or Aveeno, or Vaseline or Aquaphor ointments.          Minor Pack MD  Department of Dermatology      I saw and evaluated the patient. I personally obtained the key and critical portions of the history and physical exam or was physically present for key and critical portions performed by the resident/fellow. I reviewed the resident/fellow's documentation and discussed the patient with the resident/fellow. I agree with the resident/fellow's medical decision making as documented in the note.    Howard Stringer MD

## 2024-08-19 ENCOUNTER — APPOINTMENT (OUTPATIENT)
Dept: OBSTETRICS AND GYNECOLOGY | Facility: CLINIC | Age: 29
End: 2024-08-19
Payer: COMMERCIAL

## 2024-08-24 NOTE — PROGRESS NOTES
[facility]  Hand and Upper Extremity Service  Initial evaluation / Consultation         Consult requested by Referring Physician: Dr. Cardoza     Chief Complaint: Bilateral hand numbness         29 y.o right hand dominant Bengali speaker who is 8 weeks post-partum after pregnancy and delivery of a young healthy baby girl. In her third trimester, she started to develop numbness in both hands, left side worse than right. She hasn't had any formal treatment attempts and her symptoms have persisted after delivery. She's not breast feeding. She has no other medical conditions.          Please refer to New Patient Intake Form scanned into patient's electronic record for self reported past medical history, past surgical history, medications, allergies, family history, social history and 10 point review of systems    Examination:  Constitutional: Oriented to person, place, and time.  Appears well-developed and well-nourished.  Head: Normocephalic and atraumatic.  Eyes: Pupils are equal, round, and reactive to light.  Cardiovascular: Intact distal pulses.  Pulmonary/Chest/Breast: Effort normal. No respiratory distress.  Neurological: Alert and oriented to person, place, and time.  Skin: Skin is warm and dry.  Psychiatric: normal mood and affect.  Behavior is normal.  Musculoskeletal: Bilateral hands reveal normal appearance. No thenar atrophy. Full finger flexion of all fingers without triggering. No pain over radial styloids. Diminished sensation subjectively in all fingers. Equivocal Adriana median nerve compression test, Tinel's test, and Phalen test bilaterally.        Personal Interpretation of Diagnostic studies: No new images obtained       Impression:  Bilateral carpal tunnel syndrome       Plan: We've discussed the increased incidents of carpal tunnel syndrome associated with pregnancy. I believe her symptoms will improve over time without treatment but injections may be a way to  expedite that recovery to improve her symptoms. All of this discussion was done with the assistance of a [place] Islander speaking . She has agreed to proceed with injections and received bilateral carpal tunnel injections. She will follow up if symptoms recur and was made aware that this may recur with subsequent future pregnancies as well.       In Office Procedures Performed: Bilateral carpal tunnel injections     Hand / UE Inj/Asp: bilateral carpal tunnel for carpal tunnel syndrome on 8/26/2024 12:55 PM  Indications: pain and therapeutic  Details: 25 G needle, volar approach  Medications (Right): 20 mg triamcinolone acetonide 40 mg/mL; 0.5 mL lidocaine 10 mg/mL (1 %)  Medications (Left): 20 mg triamcinolone acetonide 40 mg/mL; 0.5 mL lidocaine 10 mg/mL (1 %)  Outcome: tolerated well, no immediate complications  Procedure, treatment alternatives, risks and benefits explained, specific risks discussed. Consent was given by the patient. Immediately prior to procedure a time out was called to verify the correct patient, procedure, equipment, support staff and site/side marked as required. Patient was prepped and draped in the usual sterile fashion.           Follow up: As needed               Daljit Granados MD  [facility]  Department of Orthopaedic Surgery  Hand and Upper Extremity Reconstruction      Scribe Attestation  By signing my name below, I, Diomedes Barrera , [place]   attest that this documentation has been prepared under the direction and in the presence of Dr. Daljit Granados.      Dictation performed with the use of voice recognition software.  Syntax and grammatical errors may exist.

## 2024-08-26 ENCOUNTER — APPOINTMENT (OUTPATIENT)
Dept: ORTHOPEDIC SURGERY | Facility: CLINIC | Age: 29
End: 2024-08-26
Payer: COMMERCIAL

## 2024-08-26 VITALS — HEIGHT: 63 IN | WEIGHT: 134 LBS | BODY MASS INDEX: 23.74 KG/M2

## 2024-08-26 DIAGNOSIS — G56.03 CARPAL TUNNEL SYNDROME, BILATERAL: Primary | ICD-10-CM

## 2024-08-26 DIAGNOSIS — G56.02 CARPAL TUNNEL SYNDROME OF LEFT WRIST: ICD-10-CM

## 2024-08-26 PROCEDURE — 20526 THER INJECTION CARP TUNNEL: CPT | Performed by: ORTHOPAEDIC SURGERY

## 2024-08-26 PROCEDURE — 99204 OFFICE O/P NEW MOD 45 MIN: CPT | Performed by: ORTHOPAEDIC SURGERY

## 2024-08-26 PROCEDURE — 3008F BODY MASS INDEX DOCD: CPT | Performed by: ORTHOPAEDIC SURGERY

## 2024-08-26 PROCEDURE — 1036F TOBACCO NON-USER: CPT | Performed by: ORTHOPAEDIC SURGERY

## 2024-08-26 RX ORDER — TRIAMCINOLONE ACETONIDE 40 MG/ML
20 INJECTION, SUSPENSION INTRA-ARTICULAR; INTRAMUSCULAR
Status: COMPLETED | OUTPATIENT
Start: 2024-08-26 | End: 2024-08-26

## 2024-08-26 RX ORDER — LIDOCAINE HYDROCHLORIDE 10 MG/ML
0.5 INJECTION INFILTRATION; PERINEURAL
Status: COMPLETED | OUTPATIENT
Start: 2024-08-26 | End: 2024-08-26

## 2024-08-26 ASSESSMENT — PAIN DESCRIPTION - DESCRIPTORS: DESCRIPTORS: ACHING;SORE

## 2024-08-26 ASSESSMENT — PAIN SCALES - GENERAL: PAINLEVEL_OUTOF10: 5 - MODERATE PAIN

## 2024-08-26 ASSESSMENT — PAIN - FUNCTIONAL ASSESSMENT: PAIN_FUNCTIONAL_ASSESSMENT: 0-10

## 2024-08-26 NOTE — LETTER
August 26, 2024     Katie TEAGUE MD  [address]  [facility] [address]  [facility] [address]    Patient: Meryl Mathias   YOB: 1995   Date of Visit: 8/26/2024       Dear Dr. Katie TEAGUE MD:    Thank you for referring Meryl Mathias to me for evaluation. Below are my notes for this consultation.  If you have questions, please do not hesitate to call me. I look forward to following your patient along with you.       Sincerely,     Daljit Granados MD      CC: No Recipients  ______________________________________________________________________________________    [facility]  Hand and Upper Extremity Service  Initial evaluation / Consultation         Consult requested by Referring Physician: Dr. Cardoza     Chief Complaint: Bilateral hand numbness         29 y.o right hand dominant Maltese speaker who is 8 weeks post-partum after pregnancy and delivery of a young healthy baby girl. In her third trimester, she started to develop numbness in both hands, left side worse than right. She hasn't had any formal treatment attempts and her symptoms have persisted after delivery. She's not breast feeding. She has no other medical conditions.          Please refer to New Patient Intake Form scanned into patient's electronic record for self reported past medical history, past surgical history, medications, allergies, family history, social history and 10 point review of systems    Examination:  Constitutional: Oriented to person, place, and time.  Appears well-developed and well-nourished.  Head: Normocephalic and atraumatic.  Eyes: Pupils are equal, round, and reactive to light.  Cardiovascular: Intact distal pulses.  Pulmonary/Chest/Breast: Effort normal. No respiratory distress.  Neurological: Alert and oriented to person, place, and time.  Skin: Skin is warm and dry.  Psychiatric: normal mood and affect.  Behavior is normal.  Musculoskeletal: Bilateral  hands reveal normal appearance. No thenar atrophy. Full finger flexion of all fingers without triggering. No pain over radial styloids. Diminished sensation subjectively in all fingers. Equivocal Adriana median nerve compression test, Tinel's test, and Phalen test bilaterally.        Personal Interpretation of Diagnostic studies: No new images obtained       Impression:  Bilateral carpal tunnel syndrome       Plan: We've discussed the increased incidents of carpal tunnel syndrome associated with pregnancy. I believe her symptoms will improve over time without treatment but injections may be a way to expedite that recovery to improve her symptoms. All of this discussion was done with the assistance of a Uruguayan speaking . She has agreed to proceed with injections and received bilateral carpal tunnel injections. She will follow up if symptoms recur and was made aware that this may recur with subsequent future pregnancies as well.       In Office Procedures Performed: Bilateral carpal tunnel injections     Hand / UE Inj/Asp: bilateral carpal tunnel for carpal tunnel syndrome on 8/26/2024 12:55 PM  Indications: pain and therapeutic  Details: 25 G needle, volar approach  Medications (Right): 20 mg triamcinolone acetonide 40 mg/mL; 0.5 mL lidocaine 10 mg/mL (1 %)  Medications (Left): 20 mg triamcinolone acetonide 40 mg/mL; 0.5 mL lidocaine 10 mg/mL (1 %)  Outcome: tolerated well, no immediate complications  Procedure, treatment alternatives, risks and benefits explained, specific risks discussed. Consent was given by the patient. Immediately prior to procedure a time out was called to verify the correct patient, procedure, equipment, support staff and site/side marked as required. Patient was prepped and draped in the usual sterile fashion.           Follow up: As needed               Daljit Granados MD  [facility]  Department of Orthopaedic Surgery  Hand and Upper  Extremity Reconstruction      Scribe Attestation  By signing my name below, I, Chilo rGaff   attest that this documentation has been prepared under the direction and in the presence of Dr. Daljit Granados.      Dictation performed with the use of voice recognition software.  Syntax and grammatical errors may exist.

## 2024-08-27 ENCOUNTER — TELEPHONE (OUTPATIENT)
Dept: PRIMARY CARE | Facility: CLINIC | Age: 29
End: 2024-08-27
Payer: COMMERCIAL

## 2024-09-23 ENCOUNTER — HOSPITAL ENCOUNTER (OUTPATIENT)
Dept: RADIOLOGY | Facility: CLINIC | Age: 29
Discharge: HOME | End: 2024-09-23
Payer: COMMERCIAL

## 2024-09-23 ENCOUNTER — APPOINTMENT (OUTPATIENT)
Dept: PRIMARY CARE | Facility: CLINIC | Age: 29
End: 2024-09-23
Payer: COMMERCIAL

## 2024-09-23 VITALS
BODY MASS INDEX: 24.1 KG/M2 | SYSTOLIC BLOOD PRESSURE: 118 MMHG | DIASTOLIC BLOOD PRESSURE: 80 MMHG | HEIGHT: 63 IN | WEIGHT: 136 LBS | TEMPERATURE: 97.8 F

## 2024-09-23 DIAGNOSIS — G89.29 CHRONIC MIDLINE LOW BACK PAIN WITHOUT SCIATICA: ICD-10-CM

## 2024-09-23 DIAGNOSIS — E55.9 MILD VITAMIN D DEFICIENCY: ICD-10-CM

## 2024-09-23 DIAGNOSIS — M54.50 CHRONIC MIDLINE LOW BACK PAIN WITHOUT SCIATICA: ICD-10-CM

## 2024-09-23 DIAGNOSIS — R53.83 OTHER FATIGUE: ICD-10-CM

## 2024-09-23 DIAGNOSIS — D50.8 OTHER IRON DEFICIENCY ANEMIA: ICD-10-CM

## 2024-09-23 DIAGNOSIS — Z76.89 ENCOUNTER TO ESTABLISH CARE: Primary | ICD-10-CM

## 2024-09-23 PROBLEM — D50.9 IRON DEFICIENCY ANEMIA: Status: ACTIVE | Noted: 2024-09-23

## 2024-09-23 LAB
25(OH)D3 SERPL-MCNC: 20 NG/ML (ref 30–100)
ANION GAP SERPL CALC-SCNC: 13 MMOL/L (ref 10–20)
BASOPHILS # BLD AUTO: 0.02 X10*3/UL (ref 0.1–1.6)
BASOPHILS NFR BLD AUTO: 0.4 % (ref 0–0.3)
BUN SERPL-MCNC: 14 MG/DL (ref 7–18)
CALCIUM SERPL-MCNC: 9.7 MG/DL (ref 8.5–10.1)
CHLORIDE SERPL-SCNC: 100 MMOL/L (ref 98–107)
CO2 SERPL-SCNC: 29 MMOL/L (ref 21–32)
CREAT SERPL-MCNC: 0.67 MG/DL (ref 0.6–1.1)
EGFRCR SERPLBLD CKD-EPI 2021: >90 ML/MIN/1.73M*2
EOSINOPHIL # BLD AUTO: 0.17 X10*3/UL (ref 0.04–0.5)
EOSINOPHIL NFR BLD AUTO: 2.76 % (ref 0.7–7)
ERYTHROCYTE [DISTWIDTH] IN BLOOD BY AUTOMATED COUNT: 14.3 % (ref 11.5–14.5)
FERRITIN SERPL-MCNC: 64 NG/ML (ref 8–150)
GLUCOSE SERPL-MCNC: 92 MG/DL (ref 74–100)
HCT VFR BLD AUTO: 40.5 % (ref 36.6–46.6)
HGB BLD-MCNC: 13.16 G/DL (ref 12–15.4)
IRON SATN MFR SERPL: 18 % (ref 25–45)
IRON SERPL-MCNC: 68 UG/DL (ref 35–150)
LYMPHOCYTES # BLD AUTO: 1.55 X10*3/UL (ref 0–6)
LYMPHOCYTES NFR BLD AUTO: 25.12 % (ref 20.5–51.1)
MCH RBC QN AUTO: 26.4 PG (ref 26–32)
MCHC RBC AUTO-ENTMCNC: 32.5 G/DL (ref 31–38)
MCV RBC AUTO: 81.2 FL (ref 80–96)
MONOCYTES # BLD AUTO: 0.29 X10*3/UL (ref 1.6–24.9)
MONOCYTES NFR BLD AUTO: 4.75 % (ref 1.7–9.3)
NEUTROPHILS # BLD AUTO: 4.15 X10*3/UL (ref 1.4–6.5)
NEUTROPHILS NFR BLD AUTO: 66.97 % (ref 42.2–75.2)
PLATELET # BLD AUTO: 335 X10*3/UL (ref 150–450)
PMV BLD AUTO: 9.92 FL (ref 7.8–11)
POTASSIUM SERPL-SCNC: 4.1 MMOL/L (ref 3.5–5.1)
RBC # BLD AUTO: 4.99 X10*6/UL (ref 3.9–5.3)
SODIUM SERPL-SCNC: 138 MMOL/L (ref 136–145)
TIBC SERPL-MCNC: 375 UG/DL (ref 240–445)
TSH SERPL-ACNC: 1.46 MIU/L (ref 0.44–3.98)
UIBC SERPL-MCNC: 307 UG/DL (ref 110–370)
WBC # BLD AUTO: 6.19 X10*3/UL (ref 4.5–10.5)

## 2024-09-23 PROCEDURE — 1036F TOBACCO NON-USER: CPT | Performed by: PHYSICIAN ASSISTANT

## 2024-09-23 PROCEDURE — 90656 IIV3 VACC NO PRSV 0.5 ML IM: CPT | Performed by: PHYSICIAN ASSISTANT

## 2024-09-23 PROCEDURE — 82306 VITAMIN D 25 HYDROXY: CPT | Performed by: PHYSICIAN ASSISTANT

## 2024-09-23 PROCEDURE — 85025 COMPLETE CBC W/AUTO DIFF WBC: CPT

## 2024-09-23 PROCEDURE — 80048 BASIC METABOLIC PNL TOTAL CA: CPT | Performed by: PHYSICIAN ASSISTANT

## 2024-09-23 PROCEDURE — 99204 OFFICE O/P NEW MOD 45 MIN: CPT | Performed by: PHYSICIAN ASSISTANT

## 2024-09-23 PROCEDURE — 84443 ASSAY THYROID STIM HORMONE: CPT | Performed by: PHYSICIAN ASSISTANT

## 2024-09-23 PROCEDURE — 72110 X-RAY EXAM L-2 SPINE 4/>VWS: CPT

## 2024-09-23 PROCEDURE — 72110 X-RAY EXAM L-2 SPINE 4/>VWS: CPT | Performed by: RADIOLOGY

## 2024-09-23 PROCEDURE — 83550 IRON BINDING TEST: CPT

## 2024-09-23 PROCEDURE — 90471 IMMUNIZATION ADMIN: CPT | Performed by: PHYSICIAN ASSISTANT

## 2024-09-23 PROCEDURE — 83540 ASSAY OF IRON: CPT

## 2024-09-23 PROCEDURE — 3008F BODY MASS INDEX DOCD: CPT | Performed by: PHYSICIAN ASSISTANT

## 2024-09-23 PROCEDURE — 82728 ASSAY OF FERRITIN: CPT

## 2024-09-23 ASSESSMENT — COLUMBIA-SUICIDE SEVERITY RATING SCALE - C-SSRS
2. HAVE YOU ACTUALLY HAD ANY THOUGHTS OF KILLING YOURSELF?: NO
1. IN THE PAST MONTH, HAVE YOU WISHED YOU WERE DEAD OR WISHED YOU COULD GO TO SLEEP AND NOT WAKE UP?: NO
6. HAVE YOU EVER DONE ANYTHING, STARTED TO DO ANYTHING, OR PREPARED TO DO ANYTHING TO END YOUR LIFE?: NO

## 2024-09-23 ASSESSMENT — ENCOUNTER SYMPTOMS
NAUSEA: 0
CONSTIPATION: 0
FACIAL SWELLING: 0
CHEST TIGHTNESS: 0
OCCASIONAL FEELINGS OF UNSTEADINESS: 0
SORE THROAT: 0
NERVOUS/ANXIOUS: 0
APPETITE CHANGE: 0
NUMBNESS: 0
CONFUSION: 0
PALPITATIONS: 0
CHILLS: 0
LOSS OF SENSATION IN FEET: 0
JOINT SWELLING: 0
ABDOMINAL DISTENTION: 0
POLYPHAGIA: 0
VOMITING: 0
COUGH: 0
WEAKNESS: 0
FREQUENCY: 0
HEMATURIA: 0
HEADACHES: 0
WHEEZING: 0
ARTHRALGIAS: 0
BACK PAIN: 1
EYE DISCHARGE: 0
FATIGUE: 0
ANAL BLEEDING: 0
MYALGIAS: 0
POLYDIPSIA: 0
FEVER: 0
EYE PAIN: 0
SLEEP DISTURBANCE: 0
DIARRHEA: 0
DEPRESSION: 0
CHOKING: 0
ABDOMINAL PAIN: 0
COLOR CHANGE: 0
SHORTNESS OF BREATH: 0
TREMORS: 0
DIZZINESS: 0
DIFFICULTY URINATING: 0

## 2024-09-23 ASSESSMENT — PAIN SCALES - GENERAL: PAINLEVEL: 0-NO PAIN

## 2024-09-23 ASSESSMENT — PATIENT HEALTH QUESTIONNAIRE - PHQ9
1. LITTLE INTEREST OR PLEASURE IN DOING THINGS: NOT AT ALL
SUM OF ALL RESPONSES TO PHQ9 QUESTIONS 1 AND 2: 0
2. FEELING DOWN, DEPRESSED OR HOPELESS: NOT AT ALL

## 2024-09-23 NOTE — PROGRESS NOTES
Subjective   Patient ID: Meryl Mathias is a 29 y.o. Haitian female with a history of carpal tunnel syndrome and anemia who presents for New Patient Visit.    HPI the patient is presented with her friend to become established as a new patient with a primary care provider.  The history is obtained with a Google translate.  She is complaining of low back pain which developed 2 months ago.  The pain is localized in the middle of the back and does not radiate. It is worse with prolonged standing.  She takes Tylenol as needed with relief.  She denies any injury or trauma.  She does not lift anything heavy except her 3-month-old baby.  Carpal tunnel syndrome - had improved after the injection.  Urticaria - well-controlled with cetirizine.  Anemia - developed after delivery in June.  Currently on iron supplement once a day.  She denies shortness of breath or fatigue.    Review of Systems   Constitutional:  Negative for appetite change, chills, fatigue and fever.   HENT:  Negative for congestion, ear pain, facial swelling, hearing loss, nosebleeds and sore throat.    Eyes:  Negative for pain, discharge and visual disturbance.   Respiratory:  Negative for cough, choking, chest tightness, shortness of breath and wheezing.    Cardiovascular:  Negative for chest pain, palpitations and leg swelling.   Gastrointestinal:  Negative for abdominal distention, abdominal pain, anal bleeding, constipation, diarrhea, nausea and vomiting.   Endocrine: Negative for cold intolerance, heat intolerance, polydipsia, polyphagia and polyuria.   Genitourinary:  Negative for difficulty urinating, frequency, hematuria and urgency.   Musculoskeletal:  Positive for back pain. Negative for arthralgias, gait problem, joint swelling and myalgias.   Skin:  Negative for color change and rash.   Neurological:  Negative for dizziness, tremors, syncope, weakness, numbness and headaches.   Psychiatric/Behavioral:  Negative for behavioral problems, confusion,  "sleep disturbance and suicidal ideas. The patient is not nervous/anxious.        Objective   /80   Temp 36.6 °C (97.8 °F) (Temporal)   Ht 1.6 m (5' 3\")   Wt 61.7 kg (136 lb)   Breastfeeding No   BMI 24.09 kg/m²     Physical Exam  Constitutional:       General: She is not in acute distress.     Appearance: Normal appearance.   HENT:      Head: Normocephalic and atraumatic.      Nose: Nose normal.   Eyes:      Extraocular Movements: Extraocular movements intact.      Conjunctiva/sclera: Conjunctivae normal.      Pupils: Pupils are equal, round, and reactive to light.   Cardiovascular:      Rate and Rhythm: Normal rate and regular rhythm.      Pulses: Normal pulses.      Heart sounds: Normal heart sounds.   Pulmonary:      Effort: Pulmonary effort is normal.      Breath sounds: Normal breath sounds.   Abdominal:      General: Bowel sounds are normal.      Palpations: Abdomen is soft.   Musculoskeletal:         General: Normal range of motion.      Cervical back: Normal range of motion and neck supple.      Comments: Tenderness over lumbar paraspinal muscles   Neurological:      General: No focal deficit present.      Mental Status: She is alert and oriented to person, place, and time.   Psychiatric:         Mood and Affect: Mood normal.         Behavior: Behavior normal.         Thought Content: Thought content normal.         Judgment: Judgment normal.         Assessment/Plan     Low back pain  Will obtain x-ray lumbar spine, requisitions given  Continue taking Tylenol 500 mg every 4 hours as needed for pain  Apply heating pads  Avoid lifting heavy    Anemia  Currently on iron supplement  We obtained CBC and iron studies today    Carpal tunnel bilateral,  Improved with injections  Follow-up with orthopedic surgery Dr. Granados    Urticaria  Stable with cetirizine  Follow-up with dermatology Dr. Stringer    I will call with blood work and x-ray results         "

## 2024-09-24 ENCOUNTER — TELEPHONE (OUTPATIENT)
Dept: PRIMARY CARE | Facility: CLINIC | Age: 29
End: 2024-09-24
Payer: COMMERCIAL

## 2024-09-24 NOTE — TELEPHONE ENCOUNTER
----- Message from Rasheeda Duenas sent at 9/23/2024  5:18 PM EDT -----  Please call her with blood test results.  Overall her blood work is pretty good.  Her hemoglobin much improved.  She needs to continue taking iron supplement.  Her vitamin D is low and she needs to take OTC vitamin D3 2000 units daily.

## 2024-09-26 ENCOUNTER — TELEPHONE (OUTPATIENT)
Dept: PRIMARY CARE | Facility: CLINIC | Age: 29
End: 2024-09-26
Payer: COMMERCIAL

## 2024-09-26 NOTE — TELEPHONE ENCOUNTER
----- Message from Rasheeda Duenas sent at 9/26/2024 12:43 PM EDT -----  Please call her  with x-ray results.  She has mild arthritis.

## 2024-11-18 ENCOUNTER — APPOINTMENT (OUTPATIENT)
Dept: DERMATOLOGY | Facility: CLINIC | Age: 29
End: 2024-11-18
Payer: COMMERCIAL

## 2024-11-18 DIAGNOSIS — L57.8 DIFFUSE PHOTODAMAGE OF SKIN: ICD-10-CM

## 2024-11-18 DIAGNOSIS — D48.5 NEOPLASM OF UNCERTAIN BEHAVIOR OF SKIN: Primary | ICD-10-CM

## 2024-11-18 DIAGNOSIS — L70.0 ACNE VULGARIS: ICD-10-CM

## 2024-11-18 PROCEDURE — 11306 SHAVE SKIN LESION 0.6-1.0 CM: CPT | Performed by: DERMATOLOGY

## 2024-11-18 PROCEDURE — 99214 OFFICE O/P EST MOD 30 MIN: CPT | Performed by: DERMATOLOGY

## 2024-11-18 RX ORDER — BENZOYL PEROXIDE 50 MG/ML
LIQUID TOPICAL
Qty: 240 G | Refills: 3 | Status: SHIPPED | OUTPATIENT
Start: 2024-11-18

## 2024-11-18 NOTE — PROGRESS NOTES
Subjective     Meryl Mathias is a 29 y.o. female who presents for the following: Acne and Suspicious Skin Lesion.  She states her acne has improved with use of Clindamycin lotion once to twice daily since her last visit, but she continues to breakout.  She is also concerned about a lesion on her upper forehead, which has been there for many years, but has increased in size since her pregnancy and sometimes itches and bleeds.  She denies any other new, changing, or concerning skin lesions since her last visit; no bleeding, itching, or burning lesions.      Review of Systems:  No other skin or systemic complaints other than what is documented elsewhere in the note.    The following portions of the chart were reviewed this encounter and updated as appropriate:       Skin Cancer History  No skin cancer on file.    Specialty Problems    None      Past Dermatologic / Past Relevant Medical History:    - history of acne  - urticaria  - no history of eczema, psoriasis, or skin cancer    Family History:    No family history of urticaria, eczema, or psoriasis    Social History:    The patient had a baby in the summer 2024 and speaks [place]ese and is accompanied by her neighbor again today and the assistance of a [place]ese     Allergies:  Patient has no known allergies.    Current Medications / CAM's:    Current Outpatient Medications:     acetaminophen (Tylenol) 325 mg tablet, Take 3 tablets (975 mg) by mouth every 6 hours if needed for mild pain (1 - 3) or moderate pain (4 - 6)., Disp: 120 tablet, Rfl: 0    cetirizine (Allergy Relief, cetirizine,) 10 mg tablet, Take 1 tablet (10 mg) by mouth once daily., Disp: 60 tablet, Rfl: 1    clindamycin (Cleocin T) 1 % lotion, Apply topically 2 times a day., Disp: 60 mL, Rfl: 11    desogestreL-ethinyl estradioL (Apri) 0.15-0.03 mg tablet, Take 1 tablet by mouth once daily., Disp: 84 tablet, Rfl: 3    ferrous sulfate, 325 mg ferrous sulfate, tablet, Take 1 tablet by mouth once  daily with breakfast., Disp: 30 tablet, Rfl: 0    hydrocortisone 2.5 % cream, Apply topically 2 times a day., Disp: 28 g, Rfl: 3    Prenatal Vitamin 27 mg iron- 0.8 mg tablet, TAKE 1 TABLET BY MOUTH EVERY DAY, Disp: 90 tablet, Rfl: 1    benzoyl peroxide 5 % external wash, Wash face once daily in the morning., Disp: 240 g, Rfl: 3     Objective   Well appearing patient in no apparent distress; mood and affect are within normal limits.    A skin examination was performed including: Face, neck, and scalp. All findings within normal limits unless otherwise noted below.    Assessment/Plan   1. Neoplasm of uncertain behavior of skin  Right medial upper forehead  8 mm dark brown pigmented, asymmetric papule with an asymmetric pigment network and irregular borders           Shave removal    Lesion length (cm):  0.8  Lesion width (cm):  0.8  Margin per side (cm):  0  Lesion diameter (cm):  0.8  Informed consent: discussed and consent obtained    Timeout: patient name, date of birth, surgical site, and procedure verified    Procedure prep:  Patient was prepped and draped  Anesthesia: the lesion was anesthetized in a standard fashion    Anesthetic:  1% lidocaine w/ epinephrine 1-100,000 local infiltration  Instrument used: flexible razor blade    Hemostasis achieved with: aluminum chloride    Outcome: patient tolerated procedure well    Post-procedure details: sterile dressing applied and wound care instructions given    Dressing type: bandage and petrolatum      Specimen 1 - Dermatopathology- DERM LAB  Differential Diagnosis: dermal nevus vs wart  Check Margins No:    Comments:    Dermpath Lab: Routine Histopathology (formalin-fixed tissue)    Atypical-appearing and changing pigmented papule - right medial upper forehead.  The clinical differential diagnosis for this lesion includes possibly nevus versus wart versus verrucous keratosis versus seborrheic keratosis.  We recommend biopsy of this lesion in order to rule out an  atypical melanocytic lesion.  After discussing the risks and benefits of various options for the biopsy, including the differences in permanent scar results, the patient expressed understanding and wishes to undergo excisional biopsy of this lesion via shave technique today.    2. Acne vulgaris  Head - Anterior (Face)  Scattered on the patient's face, there are a few open and closed comedones; a few erythematous, inflammatory papules; and a few pink to hyperpigmented macules consistent with post-inflammatory hyperpigmentation    Acne Vulgaris - flare on face; mild; mainly inflammatory type with milder comedonal component.  The nature of this condition and treatment options were discussed extensively with the patient today.  At this time, we recommend combination topical therapy with Benzoyl Peroxide 5% creamy wash once or twice daily and Clindamycin 1% lotion twice daily or up to 3-4 times per day as needed for active lesions.  The risks, benefits, and side effects of these medications, including the redness, dryness, and irritation expected with BP use, were discussed.  We also informed the patient it will likely take at least 2-3 months to notice any significant improvement with the treatment regimen outlined above, and she was instructed to discontinue use of the BP wash should she become or attempt to become pregnant at any time in the future.  She expressed understanding and is in agreement with this plan.    benzoyl peroxide 5 % external wash - Head - Anterior (Face)  Wash face once daily in the morning.    Related Medications  clindamycin (Cleocin T) 1 % lotion  Apply topically 2 times a day.    3. Diffuse photodamage of skin  Photodistributed  Diffuse photodamage with actinic changes with telangiectasia and mottled pigmentation in sun-exposed areas.    Photodamage.  The signs and symptoms of skin cancer were reviewed and the patient was advised to practice sun protection and sun avoidance, use daily  sunscreen, and perform regular self skin exams.  Sun protection was discussed, including avoiding the mid-day sun, wearing a sunscreen with SPF at least 50, and stressing the need for reapplication of sunscreen and applying more than they think they need.           Сергей Nielsen MD   PGY4, Department of Dermatology      I saw and evaluated the patient. I personally obtained the key and critical portions of the history and physical exam or was physically present for key and critical portions performed by the resident/fellow. I reviewed the resident/fellow's documentation and discussed the patient with the resident/fellow. I agree with the resident/fellow's medical decision making as documented in the note.    Howard Stringer MD

## 2024-11-20 LAB
LABORATORY COMMENT REPORT: NORMAL
PATH REPORT.FINAL DX SPEC: NORMAL
PATH REPORT.GROSS SPEC: NORMAL
PATH REPORT.MICROSCOPIC SPEC OTHER STN: NORMAL
PATH REPORT.RELEVANT HX SPEC: NORMAL
PATH REPORT.TOTAL CANCER: NORMAL

## 2025-01-19 ENCOUNTER — HOSPITAL ENCOUNTER (EMERGENCY)
Facility: HOSPITAL | Age: 30
Discharge: HOME | End: 2025-01-19
Payer: COMMERCIAL

## 2025-01-19 VITALS
WEIGHT: 136 LBS | TEMPERATURE: 98.6 F | DIASTOLIC BLOOD PRESSURE: 88 MMHG | HEART RATE: 83 BPM | OXYGEN SATURATION: 98 % | BODY MASS INDEX: 21.35 KG/M2 | RESPIRATION RATE: 17 BRPM | HEIGHT: 67 IN | SYSTOLIC BLOOD PRESSURE: 121 MMHG

## 2025-01-19 DIAGNOSIS — B34.9 VIRAL ILLNESS: Primary | ICD-10-CM

## 2025-01-19 LAB
FLUAV RNA RESP QL NAA+PROBE: NOT DETECTED
FLUBV RNA RESP QL NAA+PROBE: NOT DETECTED
SARS-COV-2 RNA RESP QL NAA+PROBE: NOT DETECTED

## 2025-01-19 PROCEDURE — 87636 SARSCOV2 & INF A&B AMP PRB: CPT | Performed by: NURSE PRACTITIONER

## 2025-01-19 PROCEDURE — 99283 EMERGENCY DEPT VISIT LOW MDM: CPT

## 2025-01-19 RX ORDER — FLUTICASONE PROPIONATE 50 MCG
1 SPRAY, SUSPENSION (ML) NASAL DAILY PRN
Qty: 16 G | Refills: 0 | Status: SHIPPED | OUTPATIENT
Start: 2025-01-19 | End: 2025-02-18

## 2025-01-19 RX ORDER — CETIRIZINE HYDROCHLORIDE 10 MG/1
10 TABLET ORAL DAILY PRN
Qty: 30 TABLET | Refills: 0 | Status: SHIPPED | OUTPATIENT
Start: 2025-01-19 | End: 2025-02-18

## 2025-01-19 ASSESSMENT — PAIN - FUNCTIONAL ASSESSMENT: PAIN_FUNCTIONAL_ASSESSMENT: 0-10

## 2025-01-19 ASSESSMENT — PAIN SCALES - GENERAL: PAINLEVEL_OUTOF10: 0 - NO PAIN

## 2025-01-19 NOTE — ED PROVIDER NOTES
HPI     CC: Flu Symptoms     HPI: Meryl Mathias is a 30 y.o. female with no past medical history who is Telugu speaking presents with sister-in-law.   via [employer] used.  Patient has had symptoms for the last 4 to 5 days of cold-like symptoms with sore throat, headache, and stuffy nose.  She has been taking Tylenol which has helped.  She has been around her 6-month-old baby who has cold/flu symptoms and she also works as a .  She states she has been well-hydrated and reports no other symptoms.  Reports no change in cough or new fevers or chills.  Denies chance of pregnancy and is not breast-feeding.    ROS: 10-point review of systems was performed and is otherwise negative except as noted in HPI.      Past Medical History: Noncontributory except per HPI     Past Surgical History: Noncontributory except per HPI     Family History: Reviewed and noncontributory     Social History: Noncontributory except per HPI.      No Known Allergies    Home Meds:   Current Outpatient Medications   Medication Instructions    acetaminophen (TYLENOL) 975 mg, oral, Every 6 hours PRN    benzoyl peroxide 5 % external wash Wash face once daily in the morning.    cetirizine (ZYRTEC) 10 mg, oral, Daily PRN    clindamycin (Cleocin T) 1 % lotion Topical, 2 times daily    desogestreL-ethinyl estradioL (Apri) 0.15-0.03 mg tablet 1 tablet, oral, Daily    ferrous sulfate, 325 mg ferrous sulfate, tablet 1 tablet, oral, Daily with breakfast    fluticasone (Flonase) 50 mcg/actuation nasal spray 1 spray, Each Nostril, Daily PRN, Shake gently. Before first use, prime pump. After use, clean tip and replace cap.    hydrocortisone 2.5 % cream Topical, 2 times daily    Prenatal Vitamin 27 mg iron- 0.8 mg tablet 1 tablet, oral, Daily        ED Triage Vitals [01/19/25 1011]   Temperature Heart Rate Respirations BP   37 °C (98.6 °F) (!) 101 18 114/71      Pulse Ox Temp src Heart Rate Source Patient Position   99 % -- -- --      BP Location  "FiO2 (%)     -- --         Heart Rate:  []   Temperature:  [37 °C (98.6 °F)]   Respirations:  [17-18]   BP: (114-121)/(71-88)   Height:  [170.2 cm (5' 7\")]   Weight:  [61.7 kg (136 lb)]   Pulse Ox:  [98 %-99 %]      Physical Exam:  Physical Exam  Vitals and nursing note reviewed.   Constitutional:       General: She is not in acute distress.     Appearance: Normal appearance. She is not ill-appearing.   HENT:      Head: Normocephalic and atraumatic.      Right Ear: External ear normal.      Left Ear: External ear normal.      Ears:      Comments: Bilateral middle ear effusions without erythema, bulging, or loss of landmarks.     Nose: Nose normal.      Mouth/Throat:      Mouth: Mucous membranes are moist.      Pharynx: No oropharyngeal exudate or posterior oropharyngeal erythema.   Eyes:      Extraocular Movements: Extraocular movements intact.      Conjunctiva/sclera: Conjunctivae normal.      Pupils: Pupils are equal, round, and reactive to light.   Cardiovascular:      Rate and Rhythm: Normal rate and regular rhythm.      Pulses: Normal pulses.   Pulmonary:      Effort: Pulmonary effort is normal. No respiratory distress.      Breath sounds: Normal breath sounds.   Abdominal:      General: Abdomen is flat.      Palpations: Abdomen is soft.      Tenderness: There is no abdominal tenderness.   Musculoskeletal:         General: Normal range of motion.      Cervical back: Normal range of motion and neck supple.   Skin:     General: Skin is warm and dry.      Capillary Refill: Capillary refill takes less than 2 seconds.   Neurological:      General: No focal deficit present.      Mental Status: She is alert and oriented to person, place, and time.   Psychiatric:         Mood and Affect: Mood normal.          Diagnostic Results        Labs Reviewed   SARS-COV-2 AND INFLUENZA A/B PCR - Normal       Result Value    Flu A Result Not Detected      Flu B Result Not Detected      Coronavirus 2019, PCR Not Detected      " Narrative:     This assay has received FDA Emergency Use Authorization (EUA) and  is only authorized for the duration of time that circumstances exist to justify the authorization of the emergency use of in vitro diagnostic tests for the detection of SARS-CoV-2 virus and/or diagnosis of COVID-19 infection under section 564(b)(1) of the Act, 21 U.S.C. 360bbb-3(b)(1). Testing for SARS-CoV-2 is only recommended for patients who meet current clinical and/or epidemiological criteria as defined by federal, state, or local public health directives. This assay is an in vitro diagnostic nucleic acid amplification test for the qualitative detection of SARS-CoV-2, Influenza A, and Influenza B from nasopharyngeal specimens and has been validated for use at [facility]. Negative results do not preclude COVID-19 infections or Influenza A/B infections, and should not be used as the sole basis for diagnosis, treatment, or other management decisions. If Influenza A/B and RSV PCR results are negative, testing for Parainfluenza virus, Adenovirus and Metapneumovirus is routinely performed for [facility] pediatric oncology and intensive care inpatients, and is available on other patients by placing an add-on request.          No orders to display                 Bob Coma Scale Score: 15                  Procedure  Procedures    ED Course & MDM   Assessment/Plan:     Medications - No data to display     Diagnoses as of 01/19/25 1102   Viral illness       Medical Decision Making    Meryl Mathias is a 30 y.o. female with no past medical history presents for flu like symptoms.  The patient is nontoxic-appearing and vital signs are initially notable for heart rate of 101 that improved to 83 on recheck without intervention.  Based on presentation, differential diagnosis includes flu, COVID, RSV, acute sinusitis, bronchitis, or other viral infection.  Will obtain swabs for the above.  Patient left before swabs returned as she  likely has a viral illness.  She was counseled as if she had COVID or generalized viral illness.  Swabs returned and were negative.  Patient appeared well-hydrated so no other interventions were started in the emergency department.    Viral illness: Educated on the test results.  We discussed that this is a viral illness that will likely resolve within 7 to 10 days.  We discussed that it is important to stay hydrated and maintain nutrition during this time to prevent dehydration.  Tylenol and Motrin are acceptable forms of treatment during this time as well as other symptomatic care with over-the-counter medications.  Also sent in Flonase and Zyrtec for symptom control.  We discussed that a secondary bacterial infection could occur and that if new fever occurs between 5 to 7 days, this could be bacterial and they should seek medical attention.  Gave strict return precautions including but not limited to chest pain, shortness of breath, new fever, no chills, nausea, vomiting, or signs of dehydration.  She was agreeable to this plan of care and felt comfortable returning home.     Disposition: Home    ED Prescriptions       Medication Sig Dispense Start Date End Date Auth. Provider    fluticasone (Flonase) 50 mcg/actuation nasal spray Administer 1 spray into each nostril once daily as needed for rhinitis. Shake gently. Before first use, prime pump. After use, clean tip and replace cap. 16 g 1/19/2025 2/18/2025 Mony Christian PA-C    cetirizine (ZyrTEC) 10 mg tablet Take 1 tablet (10 mg) by mouth once daily as needed for allergies. 30 tablet 1/19/2025 2/18/2025 Mony Christian PA-C            Social Determinants Affecting Care: Language barrier    Mony Christian PA-C    This note was dictated by speech recognition. Minor errors in transcription may be present.     Mony Christian PA-C  01/19/25 [id number]

## 2025-01-19 NOTE — ED TRIAGE NOTES
Patient is Syriac speaking presents to ed with flu like symptoms, patient has family who had flu like symptoms.

## 2025-01-19 NOTE — Clinical Note
Meryl Mathias was seen and treated in our emergency department on 1/19/2025.  She may return to work on 01/22/2025.       If you have any questions or concerns, please don't hesitate to call.      Mony Christian PA-C

## 2025-02-12 ENCOUNTER — HOSPITAL ENCOUNTER (EMERGENCY)
Facility: HOSPITAL | Age: 30
Discharge: HOME | End: 2025-02-12
Payer: COMMERCIAL

## 2025-02-12 VITALS
TEMPERATURE: 98.6 F | HEART RATE: 77 BPM | WEIGHT: 135 LBS | BODY MASS INDEX: 21.14 KG/M2 | SYSTOLIC BLOOD PRESSURE: 112 MMHG | OXYGEN SATURATION: 99 % | DIASTOLIC BLOOD PRESSURE: 75 MMHG | RESPIRATION RATE: 18 BRPM

## 2025-02-12 DIAGNOSIS — B34.9 ACUTE VIRAL SYNDROME: Primary | ICD-10-CM

## 2025-02-12 LAB
FLUAV RNA RESP QL NAA+PROBE: NOT DETECTED
FLUBV RNA RESP QL NAA+PROBE: NOT DETECTED
RSV RNA RESP QL NAA+PROBE: NOT DETECTED
S PYO DNA THROAT QL NAA+PROBE: NOT DETECTED
SARS-COV-2 RNA RESP QL NAA+PROBE: NOT DETECTED

## 2025-02-12 PROCEDURE — 99283 EMERGENCY DEPT VISIT LOW MDM: CPT

## 2025-02-12 PROCEDURE — 87651 STREP A DNA AMP PROBE: CPT

## 2025-02-12 PROCEDURE — 87637 SARSCOV2&INF A&B&RSV AMP PRB: CPT

## 2025-02-12 RX ORDER — BENZONATATE 100 MG/1
100 CAPSULE ORAL 3 TIMES DAILY PRN
Qty: 20 CAPSULE | Refills: 0 | Status: SHIPPED | OUTPATIENT
Start: 2025-02-12 | End: 2025-02-19

## 2025-02-12 RX ORDER — IBUPROFEN 600 MG/1
600 TABLET ORAL EVERY 6 HOURS PRN
Qty: 28 TABLET | Refills: 0 | Status: SHIPPED | OUTPATIENT
Start: 2025-02-12 | End: 2025-02-19

## 2025-02-12 ASSESSMENT — PAIN SCALES - GENERAL: PAINLEVEL_OUTOF10: 0 - NO PAIN

## 2025-02-12 ASSESSMENT — COLUMBIA-SUICIDE SEVERITY RATING SCALE - C-SSRS
1. IN THE PAST MONTH, HAVE YOU WISHED YOU WERE DEAD OR WISHED YOU COULD GO TO SLEEP AND NOT WAKE UP?: NO
6. HAVE YOU EVER DONE ANYTHING, STARTED TO DO ANYTHING, OR PREPARED TO DO ANYTHING TO END YOUR LIFE?: NO
2. HAVE YOU ACTUALLY HAD ANY THOUGHTS OF KILLING YOURSELF?: NO

## 2025-02-12 ASSESSMENT — PAIN - FUNCTIONAL ASSESSMENT: PAIN_FUNCTIONAL_ASSESSMENT: 0-10

## 2025-02-12 NOTE — Clinical Note
Meryl Mathias was seen and treated in our emergency department on 2/12/2025.  She may return to work on 02/15/2025.       If you have any questions or concerns, please don't hesitate to call.      Adalberto Lyman PA-C

## 2025-02-13 NOTE — ED PROVIDER NOTES
HPI   Chief Complaint   Patient presents with    Flu Symptoms       30-year-old female here with her partner as well as a viral symptoms over the past few days nothing makes better or worse besides taking over-the-counter symptoms she has aches vague shortness of breath.       used: Yes            Patient History   No past medical history on file.  No past surgical history on file.  No family history on file.  Social History     Tobacco Use    Smoking status: Never     Passive exposure: Never    Smokeless tobacco: Never   Vaping Use    Vaping status: Never Used   Substance Use Topics    Alcohol use: Never    Drug use: Never       Physical Exam   ED Triage Vitals [02/12/25 1121]   Temperature Heart Rate Respirations BP   37 °C (98.6 °F) 77 18 112/75      Pulse Ox Temp Source Heart Rate Source Patient Position   99 % Temporal Monitor --      BP Location FiO2 (%)     -- --       Physical Exam  Vitals and nursing note reviewed.   Constitutional:       General: She is not in acute distress.     Appearance: Normal appearance. She is normal weight. She is not ill-appearing, toxic-appearing or diaphoretic.   HENT:      Head: Normocephalic and atraumatic.      Right Ear: External ear normal.      Left Ear: External ear normal.      Nose: Rhinorrhea present.      Mouth/Throat:      Mouth: Mucous membranes are moist.      Pharynx: No oropharyngeal exudate or posterior oropharyngeal erythema.   Eyes:      Extraocular Movements: Extraocular movements intact.      Conjunctiva/sclera: Conjunctivae normal.      Pupils: Pupils are equal, round, and reactive to light.   Cardiovascular:      Rate and Rhythm: Normal rate and regular rhythm.   Pulmonary:      Effort: Pulmonary effort is normal. No respiratory distress.      Breath sounds: No stridor.   Abdominal:      General: There is no distension.      Tenderness: There is no guarding or rebound.   Musculoskeletal:         General: No swelling or deformity.       Cervical back: Normal range of motion.   Skin:     General: Skin is warm.      Capillary Refill: Capillary refill takes less than 2 seconds.      Findings: No rash.   Neurological:      General: No focal deficit present.      Mental Status: She is alert and oriented to person, place, and time. Mental status is at baseline.      Cranial Nerves: No cranial nerve deficit.      Sensory: No sensory deficit.      Motor: No weakness.   Psychiatric:         Mood and Affect: Mood normal.         Behavior: Behavior normal.         Thought Content: Thought content normal.         Judgment: Judgment normal.           ED Course & MDM   Diagnoses as of 02/12/25 2150   Acute viral syndrome                 No data recorded                                 Medical Decision Making  Differential diagnosis is URI versus COVID flu RSV,    Swabs unremarkable still appears viral etiology will discharge with supportive measures        Procedure  Procedures     Adalberto Lyman PA-C  02/12/25 2152